# Patient Record
Sex: MALE | Race: ASIAN | NOT HISPANIC OR LATINO
[De-identification: names, ages, dates, MRNs, and addresses within clinical notes are randomized per-mention and may not be internally consistent; named-entity substitution may affect disease eponyms.]

---

## 2021-12-18 ENCOUNTER — TRANSCRIPTION ENCOUNTER (OUTPATIENT)
Age: 26
End: 2021-12-18

## 2021-12-18 ENCOUNTER — INPATIENT (INPATIENT)
Facility: HOSPITAL | Age: 26
LOS: 7 days | Discharge: ANOTHER HOSPITAL | DRG: 853 | End: 2021-12-26
Attending: SURGERY | Admitting: SURGERY
Payer: COMMERCIAL

## 2021-12-18 VITALS
OXYGEN SATURATION: 99 % | SYSTOLIC BLOOD PRESSURE: 97 MMHG | HEART RATE: 88 BPM | TEMPERATURE: 99 F | RESPIRATION RATE: 18 BRPM | DIASTOLIC BLOOD PRESSURE: 62 MMHG

## 2021-12-18 LAB
ALBUMIN SERPL ELPH-MCNC: 3.7 G/DL — SIGNIFICANT CHANGE UP (ref 3.4–5)
ALP SERPL-CCNC: 88 U/L — SIGNIFICANT CHANGE UP (ref 40–120)
ALT FLD-CCNC: 21 U/L — SIGNIFICANT CHANGE UP (ref 12–42)
ANION GAP SERPL CALC-SCNC: 12 MMOL/L — SIGNIFICANT CHANGE UP (ref 9–16)
APPEARANCE UR: CLEAR — SIGNIFICANT CHANGE UP
AST SERPL-CCNC: 10 U/L — LOW (ref 15–37)
BASOPHILS # BLD AUTO: 0.06 K/UL — SIGNIFICANT CHANGE UP (ref 0–0.2)
BASOPHILS NFR BLD AUTO: 0.3 % — SIGNIFICANT CHANGE UP (ref 0–2)
BILIRUB SERPL-MCNC: 1 MG/DL — SIGNIFICANT CHANGE UP (ref 0.2–1.2)
BILIRUB UR-MCNC: NEGATIVE — SIGNIFICANT CHANGE UP
BUN SERPL-MCNC: 12 MG/DL — SIGNIFICANT CHANGE UP (ref 7–23)
CALCIUM SERPL-MCNC: 9.4 MG/DL — SIGNIFICANT CHANGE UP (ref 8.5–10.5)
CHLORIDE SERPL-SCNC: 95 MMOL/L — LOW (ref 96–108)
CO2 SERPL-SCNC: 26 MMOL/L — SIGNIFICANT CHANGE UP (ref 22–31)
COLOR SPEC: YELLOW — SIGNIFICANT CHANGE UP
CREAT SERPL-MCNC: 1.37 MG/DL — HIGH (ref 0.5–1.3)
DIFF PNL FLD: ABNORMAL
EOSINOPHIL # BLD AUTO: 0.11 K/UL — SIGNIFICANT CHANGE UP (ref 0–0.5)
EOSINOPHIL NFR BLD AUTO: 0.5 % — SIGNIFICANT CHANGE UP (ref 0–6)
EPI CELLS # UR: SIGNIFICANT CHANGE UP /HPF (ref 0–5)
FLUAV AG NPH QL: SIGNIFICANT CHANGE UP
FLUBV AG NPH QL: SIGNIFICANT CHANGE UP
GLUCOSE SERPL-MCNC: 149 MG/DL — HIGH (ref 70–99)
GLUCOSE UR QL: NEGATIVE — SIGNIFICANT CHANGE UP
HCT VFR BLD CALC: 43.4 % — SIGNIFICANT CHANGE UP (ref 39–50)
HGB BLD-MCNC: 14.5 G/DL — SIGNIFICANT CHANGE UP (ref 13–17)
IMM GRANULOCYTES NFR BLD AUTO: 0.6 % — SIGNIFICANT CHANGE UP (ref 0–1.5)
KETONES UR-MCNC: NEGATIVE — SIGNIFICANT CHANGE UP
LEUKOCYTE ESTERASE UR-ACNC: NEGATIVE — SIGNIFICANT CHANGE UP
LIDOCAIN IGE QN: 36 U/L — LOW (ref 73–393)
LYMPHOCYTES # BLD AUTO: 0.43 K/UL — LOW (ref 1–3.3)
LYMPHOCYTES # BLD AUTO: 1.9 % — LOW (ref 13–44)
MANUAL SMEAR VERIFICATION: SIGNIFICANT CHANGE UP
MCHC RBC-ENTMCNC: 29.8 PG — SIGNIFICANT CHANGE UP (ref 27–34)
MCHC RBC-ENTMCNC: 33.4 GM/DL — SIGNIFICANT CHANGE UP (ref 32–36)
MCV RBC AUTO: 89.3 FL — SIGNIFICANT CHANGE UP (ref 80–100)
MONOCYTES # BLD AUTO: 0.99 K/UL — HIGH (ref 0–0.9)
MONOCYTES NFR BLD AUTO: 4.4 % — SIGNIFICANT CHANGE UP (ref 2–14)
NEUTROPHILS # BLD AUTO: 20.77 K/UL — HIGH (ref 1.8–7.4)
NEUTROPHILS NFR BLD AUTO: 92.3 % — HIGH (ref 43–77)
NITRITE UR-MCNC: NEGATIVE — SIGNIFICANT CHANGE UP
NRBC # BLD: 0 /100 WBCS — SIGNIFICANT CHANGE UP (ref 0–0)
PH UR: 6.5 — SIGNIFICANT CHANGE UP (ref 5–8)
PLAT MORPH BLD: NORMAL — SIGNIFICANT CHANGE UP
PLATELET # BLD AUTO: 304 K/UL — SIGNIFICANT CHANGE UP (ref 150–400)
POTASSIUM SERPL-MCNC: 3.4 MMOL/L — LOW (ref 3.5–5.3)
POTASSIUM SERPL-SCNC: 3.4 MMOL/L — LOW (ref 3.5–5.3)
PROT SERPL-MCNC: 8.5 G/DL — HIGH (ref 6.4–8.2)
PROT UR-MCNC: 100 MG/DL
RBC # BLD: 4.86 M/UL — SIGNIFICANT CHANGE UP (ref 4.2–5.8)
RBC # FLD: 11.9 % — SIGNIFICANT CHANGE UP (ref 10.3–14.5)
RBC BLD AUTO: NORMAL — SIGNIFICANT CHANGE UP
RBC CASTS # UR COMP ASSIST: < 5 /HPF — SIGNIFICANT CHANGE UP
RSV RNA NPH QL NAA+NON-PROBE: SIGNIFICANT CHANGE UP
SARS-COV-2 RNA SPEC QL NAA+PROBE: SIGNIFICANT CHANGE UP
SODIUM SERPL-SCNC: 133 MMOL/L — SIGNIFICANT CHANGE UP (ref 132–145)
SP GR SPEC: 1.02 — SIGNIFICANT CHANGE UP (ref 1–1.03)
UROBILINOGEN FLD QL: 0.2 E.U./DL — SIGNIFICANT CHANGE UP
WBC # BLD: 22.5 K/UL — HIGH (ref 3.8–10.5)
WBC # FLD AUTO: 22.5 K/UL — HIGH (ref 3.8–10.5)
WBC UR QL: < 5 /HPF — SIGNIFICANT CHANGE UP

## 2021-12-18 PROCEDURE — 99284 EMERGENCY DEPT VISIT MOD MDM: CPT

## 2021-12-18 PROCEDURE — 71046 X-RAY EXAM CHEST 2 VIEWS: CPT | Mod: 26

## 2021-12-18 PROCEDURE — 74177 CT ABD & PELVIS W/CONTRAST: CPT | Mod: 26

## 2021-12-18 RX ORDER — SODIUM CHLORIDE 9 MG/ML
1000 INJECTION INTRAMUSCULAR; INTRAVENOUS; SUBCUTANEOUS ONCE
Refills: 0 | Status: COMPLETED | OUTPATIENT
Start: 2021-12-18 | End: 2021-12-18

## 2021-12-18 RX ORDER — KETOROLAC TROMETHAMINE 30 MG/ML
15 SYRINGE (ML) INJECTION ONCE
Refills: 0 | Status: DISCONTINUED | OUTPATIENT
Start: 2021-12-18 | End: 2021-12-18

## 2021-12-18 RX ORDER — METRONIDAZOLE 500 MG
500 TABLET ORAL ONCE
Refills: 0 | Status: COMPLETED | OUTPATIENT
Start: 2021-12-18 | End: 2021-12-18

## 2021-12-18 RX ORDER — ACETAMINOPHEN 500 MG
650 TABLET ORAL ONCE
Refills: 0 | Status: COMPLETED | OUTPATIENT
Start: 2021-12-18 | End: 2021-12-18

## 2021-12-18 RX ORDER — MORPHINE SULFATE 50 MG/1
4 CAPSULE, EXTENDED RELEASE ORAL ONCE
Refills: 0 | Status: DISCONTINUED | OUTPATIENT
Start: 2021-12-18 | End: 2021-12-18

## 2021-12-18 RX ORDER — IOHEXOL 300 MG/ML
30 INJECTION, SOLUTION INTRAVENOUS ONCE
Refills: 0 | Status: COMPLETED | OUTPATIENT
Start: 2021-12-18 | End: 2021-12-18

## 2021-12-18 RX ORDER — CEFTRIAXONE 500 MG/1
1000 INJECTION, POWDER, FOR SOLUTION INTRAMUSCULAR; INTRAVENOUS ONCE
Refills: 0 | Status: COMPLETED | OUTPATIENT
Start: 2021-12-18 | End: 2021-12-18

## 2021-12-18 RX ADMIN — Medication 100 MILLIGRAM(S): at 23:43

## 2021-12-18 RX ADMIN — SODIUM CHLORIDE 1000 MILLILITER(S): 9 INJECTION INTRAMUSCULAR; INTRAVENOUS; SUBCUTANEOUS at 19:00

## 2021-12-18 RX ADMIN — CEFTRIAXONE 100 MILLIGRAM(S): 500 INJECTION, POWDER, FOR SOLUTION INTRAMUSCULAR; INTRAVENOUS at 23:40

## 2021-12-18 RX ADMIN — IOHEXOL 30 MILLILITER(S): 300 INJECTION, SOLUTION INTRAVENOUS at 20:52

## 2021-12-18 RX ADMIN — Medication 15 MILLIGRAM(S): at 19:01

## 2021-12-18 RX ADMIN — Medication 650 MILLIGRAM(S): at 19:01

## 2021-12-18 NOTE — ED ADULT TRIAGE NOTE - DOMESTIC TRAVEL HIGH RISK QUESTION
EGD note reviewed.   HGB is still at 6.5 up from 6.3 with 1 unit PRBC/     Will give 1 unit PRBC now.   D/w Mariel GRIFFITH RN   No

## 2021-12-18 NOTE — ED PROVIDER NOTE - ATTENDING CONTRIBUTION TO CARE
25 y/o M presenting with lower Abd pain and N/V/D. On exam, Pt appears well and in no acute distress. Plan for workup to r/o COVID-19 versus UTI versus gastritis. Will reassess clinically after results have been obtained.  (+) abdominal tenderness on re-exam. wbc 22, T 99, CT a/p ordered.  Appy admit to surg Marco

## 2021-12-18 NOTE — ED ADULT NURSE REASSESSMENT NOTE - NS ED NURSE REASSESS COMMENT FT1
Transfer of care from SAUL Chacon. Pt resting in a chair comfortably, in NAD. Pending CT results. Will continue to monitor.

## 2021-12-18 NOTE — ED PROVIDER NOTE - CLINICAL SUMMARY MEDICAL DECISION MAKING FREE TEXT BOX
25 y/o M presenting with lower Abd pain and N/V/D. On exam, Pt appears well and in no acute distress. Plan for workup to r/o COVID-19 versus UTI versus gastritis. Will reassess clinically after results have been obtained. 27 y/o M presenting with lower Abd pain and N/V/D. On exam, Pt appears well and in no acute distress. Plan for workup to r/o COVID-19 versus UTI versus gastritis. Will reassess clinically after results have been obtained.  (+) abdominal tenderness on re-exam. wbc 22, T 99, CT a/p ordered.

## 2021-12-18 NOTE — ED PROVIDER NOTE - PROGRESS NOTE DETAILS
Patient continues to complain of abdominal pain. On re-examination, abdomen is soft, Tenderness to suprapubic and RLQ. CT a/p ordered.

## 2021-12-18 NOTE — ED PROVIDER NOTE - OBJECTIVE STATEMENT
27 y/o M with no PMH presents to the ED for an acute onset of lower Abd pain, N/V, yellow diarrhea, and difficulty urinating. Pt reports symptoms started last night and progressed this morning. He endorses shortness of breath when the pain increases. Pt notes subjective fevers. He ate sushi last night which could be attributed to the symptoms [although his girlfriend is asymptomatic after eating the same meal]. Pt denies recent travels. He is vaccinated for COVID-19.

## 2021-12-19 ENCOUNTER — RESULT REVIEW (OUTPATIENT)
Age: 26
End: 2021-12-19

## 2021-12-19 DIAGNOSIS — K08.409 PARTIAL LOSS OF TEETH, UNSPECIFIED CAUSE, UNSPECIFIED CLASS: Chronic | ICD-10-CM

## 2021-12-19 LAB
ANION GAP SERPL CALC-SCNC: 15 MMOL/L — SIGNIFICANT CHANGE UP (ref 5–17)
APTT BLD: 57.3 SEC — HIGH (ref 27.5–35.5)
BLD GP AB SCN SERPL QL: NEGATIVE — SIGNIFICANT CHANGE UP
BUN SERPL-MCNC: 12 MG/DL — SIGNIFICANT CHANGE UP (ref 7–23)
CALCIUM SERPL-MCNC: 9.4 MG/DL — SIGNIFICANT CHANGE UP (ref 8.4–10.5)
CHLORIDE SERPL-SCNC: 99 MMOL/L — SIGNIFICANT CHANGE UP (ref 96–108)
CO2 SERPL-SCNC: 19 MMOL/L — LOW (ref 22–31)
CREAT SERPL-MCNC: 1.24 MG/DL — SIGNIFICANT CHANGE UP (ref 0.5–1.3)
CULTURE RESULTS: NO GROWTH — SIGNIFICANT CHANGE UP
GLUCOSE SERPL-MCNC: 128 MG/DL — HIGH (ref 70–99)
HCT VFR BLD CALC: 38.9 % — LOW (ref 39–50)
HGB BLD-MCNC: 13.2 G/DL — SIGNIFICANT CHANGE UP (ref 13–17)
INR BLD: 1.26 — HIGH (ref 0.88–1.16)
MAGNESIUM SERPL-MCNC: 1.4 MG/DL — LOW (ref 1.6–2.6)
MCHC RBC-ENTMCNC: 30.6 PG — SIGNIFICANT CHANGE UP (ref 27–34)
MCHC RBC-ENTMCNC: 33.9 GM/DL — SIGNIFICANT CHANGE UP (ref 32–36)
MCV RBC AUTO: 90 FL — SIGNIFICANT CHANGE UP (ref 80–100)
NRBC # BLD: 0 /100 WBCS — SIGNIFICANT CHANGE UP (ref 0–0)
PHOSPHATE SERPL-MCNC: 1.6 MG/DL — LOW (ref 2.5–4.5)
PLATELET # BLD AUTO: 286 K/UL — SIGNIFICANT CHANGE UP (ref 150–400)
POTASSIUM SERPL-MCNC: 3.4 MMOL/L — LOW (ref 3.5–5.3)
POTASSIUM SERPL-SCNC: 3.4 MMOL/L — LOW (ref 3.5–5.3)
PROTHROM AB SERPL-ACNC: 15 SEC — HIGH (ref 10.6–13.6)
RBC # BLD: 4.32 M/UL — SIGNIFICANT CHANGE UP (ref 4.2–5.8)
RBC # FLD: 12 % — SIGNIFICANT CHANGE UP (ref 10.3–14.5)
RH IG SCN BLD-IMP: POSITIVE — SIGNIFICANT CHANGE UP
SODIUM SERPL-SCNC: 133 MMOL/L — LOW (ref 135–145)
SPECIMEN SOURCE: SIGNIFICANT CHANGE UP
WBC # BLD: 18.34 K/UL — HIGH (ref 3.8–10.5)
WBC # FLD AUTO: 18.34 K/UL — HIGH (ref 3.8–10.5)

## 2021-12-19 PROCEDURE — 88312 SPECIAL STAINS GROUP 1: CPT | Mod: 26

## 2021-12-19 PROCEDURE — 88305 TISSUE EXAM BY PATHOLOGIST: CPT | Mod: 26

## 2021-12-19 PROCEDURE — 93010 ELECTROCARDIOGRAM REPORT: CPT

## 2021-12-19 PROCEDURE — 71045 X-RAY EXAM CHEST 1 VIEW: CPT | Mod: 26

## 2021-12-19 PROCEDURE — 99255 IP/OBS CONSLTJ NEW/EST HI 80: CPT | Mod: GC

## 2021-12-19 RX ORDER — FLUCONAZOLE 150 MG/1
TABLET ORAL
Refills: 0 | Status: DISCONTINUED | OUTPATIENT
Start: 2021-12-19 | End: 2021-12-22

## 2021-12-19 RX ORDER — METRONIDAZOLE 500 MG
500 TABLET ORAL EVERY 8 HOURS
Refills: 0 | Status: DISCONTINUED | OUTPATIENT
Start: 2021-12-19 | End: 2021-12-19

## 2021-12-19 RX ORDER — FLUCONAZOLE 150 MG/1
400 TABLET ORAL ONCE
Refills: 0 | Status: COMPLETED | OUTPATIENT
Start: 2021-12-19 | End: 2021-12-19

## 2021-12-19 RX ORDER — CEFTRIAXONE 500 MG/1
1000 INJECTION, POWDER, FOR SOLUTION INTRAMUSCULAR; INTRAVENOUS EVERY 24 HOURS
Refills: 0 | Status: DISCONTINUED | OUTPATIENT
Start: 2021-12-19 | End: 2021-12-19

## 2021-12-19 RX ORDER — ACETAMINOPHEN 500 MG
650 TABLET ORAL EVERY 6 HOURS
Refills: 0 | Status: DISCONTINUED | OUTPATIENT
Start: 2021-12-19 | End: 2021-12-23

## 2021-12-19 RX ORDER — ACETAMINOPHEN 500 MG
1000 TABLET ORAL ONCE
Refills: 0 | Status: COMPLETED | OUTPATIENT
Start: 2021-12-19 | End: 2021-12-19

## 2021-12-19 RX ORDER — KETOROLAC TROMETHAMINE 30 MG/ML
15 SYRINGE (ML) INJECTION EVERY 6 HOURS
Refills: 0 | Status: DISCONTINUED | OUTPATIENT
Start: 2021-12-19 | End: 2021-12-22

## 2021-12-19 RX ORDER — HEPARIN SODIUM 5000 [USP'U]/ML
5000 INJECTION INTRAVENOUS; SUBCUTANEOUS EVERY 8 HOURS
Refills: 0 | Status: DISCONTINUED | OUTPATIENT
Start: 2021-12-19 | End: 2021-12-22

## 2021-12-19 RX ORDER — ONDANSETRON 8 MG/1
4 TABLET, FILM COATED ORAL EVERY 6 HOURS
Refills: 0 | Status: DISCONTINUED | OUTPATIENT
Start: 2021-12-19 | End: 2021-12-23

## 2021-12-19 RX ORDER — SODIUM CHLORIDE 9 MG/ML
1000 INJECTION, SOLUTION INTRAVENOUS
Refills: 0 | Status: DISCONTINUED | OUTPATIENT
Start: 2021-12-19 | End: 2021-12-20

## 2021-12-19 RX ORDER — FLUCONAZOLE 150 MG/1
400 TABLET ORAL EVERY 24 HOURS
Refills: 0 | Status: DISCONTINUED | OUTPATIENT
Start: 2021-12-20 | End: 2021-12-22

## 2021-12-19 RX ORDER — HYDROMORPHONE HYDROCHLORIDE 2 MG/ML
0.5 INJECTION INTRAMUSCULAR; INTRAVENOUS; SUBCUTANEOUS EVERY 4 HOURS
Refills: 0 | Status: DISCONTINUED | OUTPATIENT
Start: 2021-12-19 | End: 2021-12-20

## 2021-12-19 RX ORDER — PIPERACILLIN AND TAZOBACTAM 4; .5 G/20ML; G/20ML
3.38 INJECTION, POWDER, LYOPHILIZED, FOR SOLUTION INTRAVENOUS EVERY 6 HOURS
Refills: 0 | Status: DISCONTINUED | OUTPATIENT
Start: 2021-12-19 | End: 2021-12-23

## 2021-12-19 RX ORDER — HYDROMORPHONE HYDROCHLORIDE 2 MG/ML
1 INJECTION INTRAMUSCULAR; INTRAVENOUS; SUBCUTANEOUS EVERY 4 HOURS
Refills: 0 | Status: DISCONTINUED | OUTPATIENT
Start: 2021-12-19 | End: 2021-12-20

## 2021-12-19 RX ADMIN — Medication 650 MILLIGRAM(S): at 18:20

## 2021-12-19 RX ADMIN — MORPHINE SULFATE 4 MILLIGRAM(S): 50 CAPSULE, EXTENDED RELEASE ORAL at 00:06

## 2021-12-19 RX ADMIN — FLUCONAZOLE 100 MILLIGRAM(S): 150 TABLET ORAL at 13:39

## 2021-12-19 RX ADMIN — SODIUM CHLORIDE 110 MILLILITER(S): 9 INJECTION, SOLUTION INTRAVENOUS at 05:06

## 2021-12-19 RX ADMIN — Medication 100 MILLIGRAM(S): at 07:28

## 2021-12-19 RX ADMIN — Medication 15 MILLIGRAM(S): at 18:35

## 2021-12-19 RX ADMIN — Medication 400 MILLIGRAM(S): at 05:06

## 2021-12-19 RX ADMIN — PIPERACILLIN AND TAZOBACTAM 200 GRAM(S): 4; .5 INJECTION, POWDER, LYOPHILIZED, FOR SOLUTION INTRAVENOUS at 13:05

## 2021-12-19 RX ADMIN — Medication 650 MILLIGRAM(S): at 19:20

## 2021-12-19 RX ADMIN — HEPARIN SODIUM 5000 UNIT(S): 5000 INJECTION INTRAVENOUS; SUBCUTANEOUS at 16:29

## 2021-12-19 RX ADMIN — Medication 15 MILLIGRAM(S): at 13:06

## 2021-12-19 RX ADMIN — Medication 15 MILLIGRAM(S): at 18:21

## 2021-12-19 RX ADMIN — HEPARIN SODIUM 5000 UNIT(S): 5000 INJECTION INTRAVENOUS; SUBCUTANEOUS at 05:06

## 2021-12-19 RX ADMIN — Medication 15 MILLIGRAM(S): at 13:38

## 2021-12-19 RX ADMIN — HEPARIN SODIUM 5000 UNIT(S): 5000 INJECTION INTRAVENOUS; SUBCUTANEOUS at 22:45

## 2021-12-19 RX ADMIN — PIPERACILLIN AND TAZOBACTAM 200 GRAM(S): 4; .5 INJECTION, POWDER, LYOPHILIZED, FOR SOLUTION INTRAVENOUS at 18:21

## 2021-12-19 RX ADMIN — Medication 1000 MILLIGRAM(S): at 05:30

## 2021-12-19 NOTE — H&P ADULT - HISTORY OF PRESENT ILLNESS
25 yo M with no sign PMH/PSH presenting with one day of abdominal pain. Associated with nausea, 1 episode of NBNB emesis, diarrhea, fever/chills, decreased appetite. He reports that he had difficulty urinating yesterday - which resolved. Of note he had similar abd pain on Tuesday but pain resolved until Friday night.     Colonoscopy - never    ED: Afebrile. soft BP - responded to bolus. WBC 22.5, Cr = 1.37, CT - appendix measuring up to 1.1 cm in diameter, no appendicolith    PMH: partial fusion of L5  PSH: wisdom teeth removal  FH: UC - father  Meds: none  Soc: non smoking, ETOH socially 5-10 drinks on the weekend, no recreational drug use

## 2021-12-19 NOTE — H&P ADULT - NSHPPHYSICALEXAM_GEN_ALL_CORE
LOS:     VITALS:   T(C): 37.5 (12-19-21 @ 06:17), Max: 37.5 (12-19-21 @ 02:18)  HR: 88 (12-19-21 @ 06:17) (88 - 100)  BP: 101/54 (12-19-21 @ 04:28) (97/62 - 126/76)  RR: 18 (12-19-21 @ 04:28) (18 - 18)  SpO2: 98% (12-19-21 @ 04:28) (97% - 99%)    GENERAL: NAD, lying in bed comfortably  HEAD:  Atraumatic, Normocephalic  CHEST/LUNG: Clear to auscultation bilaterally;  Unlabored respirations  HEART: Regular rate and rhythm;   ABDOMEN: mildly distended, guarding, tender to palpation in all 4 quadrants (worst in RLQ), +Rovsing sign, - psoas sign  NERVOUS SYSTEM:  no focal deficits LOS:     VITALS:   T(C): 37.5 (12-19-21 @ 06:17), Max: 37.5 (12-19-21 @ 02:18)  HR: 88 (12-19-21 @ 06:17) (88 - 100)  BP: 101/54 (12-19-21 @ 04:28) (97/62 - 126/76)  RR: 18 (12-19-21 @ 04:28) (18 - 18)  SpO2: 98% (12-19-21 @ 04:28) (97% - 99%)    GENERAL: NAD, lying in bed comfortably  HEAD:  Atraumatic, Normocephalic  CHEST/LUNG: Clear to auscultation bilaterally;  Unlabored respirations  HEART: Regular rate and rhythm;   ABDOMEN: firm, mildly distended, guarding, tender to palpation in all 4 quadrants (worst in suprpubic region), +Rovsing sign, - psoas sign  NERVOUS SYSTEM:  no focal deficits

## 2021-12-19 NOTE — H&P ADULT - ASSESSMENT
25 yo M with no sign PMH/PSH presenting with appendicitis.    NPO/IVF  Pain/Nausea PRN  Ceft/Flagyl  HSQ/SCD  Added on for OR  Plan discussed with chief and attending

## 2021-12-19 NOTE — H&P ADULT - NSHPLABSRESULTS_GEN_ALL_CORE
.  LABS:                         13.2   18.34 )-----------( 286      ( 19 Dec 2021 06:22 )             38.9         133<L>  |  99  |  12  ----------------------------<  128<H>  3.4<L>   |  19<L>  |  1.24    Ca    9.4      19 Dec 2021 06:22  Phos  1.6       Mg     1.4         TPro  8.5<H>  /  Alb  3.7  /  TBili  1.0  /  DBili  x   /  AST  10<L>  /  ALT  21  /  AlkPhos  88      PT/INR - ( 19 Dec 2021 06:22 )   PT: 15.0 sec;   INR: 1.26          PTT - ( 19 Dec 2021 06:22 )  PTT:57.3 sec  Urinalysis Basic - ( 18 Dec 2021 19:24 )    Color: Yellow / Appearance: Clear / S.020 / pH: x  Gluc: x / Ketone: NEGATIVE  / Bili: NEGATIVE / Urobili: 0.2 E.U./dL   Blood: x / Protein: 100 mg/dL / Nitrite: NEGATIVE   Leuk Esterase: NEGATIVE / RBC: < 5 /HPF / WBC < 5 /HPF   Sq Epi: x / Non Sq Epi: 0-5 /HPF / Bacteria: x            CT abd/pelvis ()  INTERPRETATION: CT of the ABDOMEN and PELVIS with intravenous contrast dated 2021 9:50 PM    INDICATION: Abdominal pain    TECHNIQUE: CT of the abdomen and pelvis with intravenous and oral contrast. Axial, sagittal, and coronal images were obtained and reviewed. 93 cc of Omnipaque 350 intravenous contrast was administered; 7 cc was discarded.    COMPARISON: None.    FINDINGS:    Lower chest: Solitary left lower lobe micronodule.    Liver: Smooth contour. No focal lesion.    Biliary system: Gallbladder is normal in size. No calcified gallstones. No biliary ductal dilatation.    Pancreas: Unremarkable.    Spleen: Unremarkable.    Adrenal glands: Unremarkable.    Kidneys: 0.8 cm left lower pole hypodensity, too small to characterize. No hydronephrosis. No renal or ureteral stone.    Bowel/Peritoneum: Ill-defined, enlarged appendix measuring up to 1.1 cm in diameter with periappendiceal fat stranding and thickening of the adjacent lateral conal fascia. No periappendiceal fluid collection or evidence of perforation. Enteric contrast is seen reaching the mid small bowel. Mesenteric fat stranding and engorgement of the vasa recta involving the small bowel and colon which may be reactive from adjacent appendicitis or concurrent enterocolitis. Small pelvic free fluid with greater than simple fluid density (HU 29) which may represent small blood products. No bowel obstruction. No free air.    Pelvic organs: Unremarkable.    Lymph nodes: Multiple mildly enlarged right lower quadrant mesenteric lymph nodes. 1.0 cm, likely reactive.    Vascular: Normal caliber aorta.    Bones/Soft tissues: No significant abnormality.      IMPRESSION:    1. Acute appendicitis without periappendiceal fluid collection or evidence of perforation.  2. Small pelvic free fluid with greater than simple fluid density which may represent intermixed blood products.

## 2021-12-19 NOTE — BRIEF OPERATIVE NOTE - OPERATION/FINDINGS
Shannan trocar cutdown. UR-6 VIcryl placed. Insufflation. ABdomen with peritonitis, evidence of supparative perforation of appendix. With multiple pockets of abscess. Cecum and ileum severely adhered in pelvis, unable to visualize or mobilize to IC valve or cecum. Ligasure to mobilize proximal ascending colon. Converted to open, with RLQ incision. Blunt dissection to free up adhesions and purulent pockets. Area of suspected appendiceal perforation identifed. Dissected back to area of healthy cecum. At this point we used LLUVIA blue load 75 to staple at healthy area. Oversewed with 2-0 VIcryl. Shannan trocar cutdown. UR-6 VIcryl placed. Insufflation. ABdomen with peritonitis, evidence of supparative perforation of appendix. With multiple pockets of abscess. Cecum and ileum severely adhered in pelvis, unable to visualize or mobilize to IC valve or cecum. Ligasure to mobilize proximal ascending colon. Converted to open, with RLQ incision. Blunt dissection to free up adhesions and purulent pockets. Area of suspected appendiceal perforation identifed. Dissected back to area of healthy cecum. At this point we used LLUVIA blue load 75 to staple at healthy area. Oversewed with 2-0 VIcryl. Abdomen was irrigated. Luis Manuel 19Fr placed in pelvisClosure of peritoneal defect with 2-0 Vicryl. 2-0 PDS used for fascial closure. Skin was left loosely approximated. Monocryl for closure of previous port sites

## 2021-12-19 NOTE — H&P ADULT - NSICDXFAMILYHX_GEN_ALL_CORE_FT
FAMILY HISTORY:  Father  Still living? Unknown  Family history of ulcerative colitis, Age at diagnosis: Age Unknown

## 2021-12-19 NOTE — PROVIDER CONTACT NOTE (CHANGE IN STATUS NOTIFICATION) - ACTION/TREATMENT ORDERED:
Notified provider. tylenol administered. Provider at bedside. cultures done by provider. No other interventions at this time. Will continue to monitor. Waiting for OR.

## 2021-12-19 NOTE — PROGRESS NOTE ADULT - SUBJECTIVE AND OBJECTIVE BOX
POST-OPERATIVE NOTE    Procedure: laparoscopic appendectomy with open cecum excision    Diagnosis/Indication: ruptured suppurative appendicitis    Surgeon: Elizabet Dela Cruz    S: Pt has no complaints. Mild RLQ abdominal tenderness. Denies CP, SOB, MELENDEZ, calf tenderness. Pain controlled with medication. Denies nausea, vomiting.    O:  T(C): 36.7 (12-19-21 @ 14:21), Max: 36.7 (12-19-21 @ 14:21)  T(F): 98.1 (12-19-21 @ 14:21), Max: 98.1 (12-19-21 @ 14:21)  HR: 71 (12-19-21 @ 14:21) (71 - 71)  BP: 114/65 (12-19-21 @ 14:21) (114/65 - 114/65)  RR: 18 (12-19-21 @ 14:21) (18 - 18)  SpO2: 95% (12-19-21 @ 14:21) (95% - 95%)  Wt(kg): --                        13.2   18.34 )-----------( 286      ( 19 Dec 2021 06:22 )             38.9     12-19    133<L>  |  99  |  12  ----------------------------<  128<H>  3.4<L>   |  19<L>  |  1.24    Ca    9.4      19 Dec 2021 06:22  Phos  1.6     12-19  Mg     1.4     12-19    TPro  8.5<H>  /  Alb  3.7  /  TBili  1.0  /  DBili  x   /  AST  10<L>  /  ALT  21  /  AlkPhos  88  12-18      Gen: NAD, resting comfortably in bed  C/V: NSR  Pulm: Nonlabored breathing, no respiratory distress  Abd: soft, appropriately tender palpation, laparoscopic incisions c/d/i  Extrem: WWP, no calf edema or tenderness, SCDs in place      A/P: 26y Male s/p above procedure  Diet: Clears  IVF: LR @ 110  Fluconazole/Zosyn  Pain/nausea control  SQH/SCDs/OOBA/IS  Dispo: Floors

## 2021-12-19 NOTE — CONSULT NOTE ADULT - ASSESSMENT
# Sepsis (present on admission)  At time of arrival to [facility], met at least 2/4 SIRS criteria. WBCs 22, HR >90bpm,  Source: Likely 2/2 acute appendicitis with perforation   Improved with appendectomy and empiric antibiotics  26 year old man admitted for acute appendicitis complicated by sepsis     # Sepsis (present on admission)  At time of arrival to [facility], met at least 2/4 SIRS criteria. WBCs 22, HR >90bpm,  Source: Likely 2/2 acute appendicitis with perforation   Improved with appendectomy and empiric antibiotics     # Acute appendicitis  s/p appendectomy   Advance diet as tolerated   Rest of care per primary team     # post-operative state  Encourage ambulation   Encourage incentive spirometry   Recommend miralax qd if patient develops constipation post-op    # Hypophosphatemia - Phosphorus Level, Serum:   Phosphorus Level, Serum: 1.6 mg/dL (12-19-21 @ 06:22)  ; Likely 2/2 poor PO intake Replete    # Hypokalemia   Potassium, Serum: 3.4 mmol/L (12-19-21 @ 06:22)   ; Likely 2/2 poor PO intake; Replete    # Hypomagnesemia  Magnesium, Serum: 1.4 mg/dL (12-19-21 @ 06:22)   ; Likely 2/2 poor PO intake; Replete

## 2021-12-19 NOTE — CONSULT NOTE ADULT - NSCONSULTADDITIONALINFOA_GEN_ALL_CORE
High risk medical decision making: required intravenous morphine for pain control on 12/19. Underwent appendectomy (laparoscopic converted to open ) on 12/19

## 2021-12-19 NOTE — PACU DISCHARGE NOTE - COMMENTS
report given to garland laurent, pt. awake, a/o x4 . hemodynamically stable. denies pain. dsg intact/dryw/ jpx1 serosanguinous fluids small amount. guerrier cath draining yellow urine. d/c to floor stretcher.

## 2021-12-19 NOTE — PATIENT PROFILE ADULT - FALL HARM RISK - UNIVERSAL INTERVENTIONS
Bed in lowest position, wheels locked, appropriate side rails in place/Call bell, personal items and telephone in reach/Instruct patient to call for assistance before getting out of bed or chair/Non-slip footwear when patient is out of bed/D Lo to call system/Physically safe environment - no spills, clutter or unnecessary equipment/Purposeful Proactive Rounding/Room/bathroom lighting operational, light cord in reach

## 2021-12-19 NOTE — PROVIDER CONTACT NOTE (CHANGE IN STATUS NOTIFICATION) - ASSESSMENT
Pt denies sob, chest pain, blurry vision and dizziness. temp oral- 103.1F, HR-88, BP-101/54, RR-18 and O2- 98% RA. No interventions at this time.

## 2021-12-20 LAB
ANION GAP SERPL CALC-SCNC: 11 MMOL/L — SIGNIFICANT CHANGE UP (ref 5–17)
BILIRUB SERPL-MCNC: 0.2 MG/DL — SIGNIFICANT CHANGE UP (ref 0.2–1.2)
BUN SERPL-MCNC: 13 MG/DL — SIGNIFICANT CHANGE UP (ref 7–23)
CALCIUM SERPL-MCNC: 9 MG/DL — SIGNIFICANT CHANGE UP (ref 8.4–10.5)
CHLORIDE SERPL-SCNC: 105 MMOL/L — SIGNIFICANT CHANGE UP (ref 96–108)
CO2 SERPL-SCNC: 22 MMOL/L — SIGNIFICANT CHANGE UP (ref 22–31)
CREAT SERPL-MCNC: 1.07 MG/DL — SIGNIFICANT CHANGE UP (ref 0.5–1.3)
GLUCOSE SERPL-MCNC: 192 MG/DL — HIGH (ref 70–99)
GRAM STN FLD: SIGNIFICANT CHANGE UP
HCT VFR BLD CALC: 34.9 % — LOW (ref 39–50)
HGB BLD-MCNC: 11.5 G/DL — LOW (ref 13–17)
INR BLD: 1.15 — SIGNIFICANT CHANGE UP (ref 0.88–1.16)
MAGNESIUM SERPL-MCNC: 1.8 MG/DL — SIGNIFICANT CHANGE UP (ref 1.6–2.6)
MCHC RBC-ENTMCNC: 29.9 PG — SIGNIFICANT CHANGE UP (ref 27–34)
MCHC RBC-ENTMCNC: 33 GM/DL — SIGNIFICANT CHANGE UP (ref 32–36)
MCV RBC AUTO: 90.6 FL — SIGNIFICANT CHANGE UP (ref 80–100)
MELD SCORE WITH DIALYSIS: 21 POINTS — SIGNIFICANT CHANGE UP
MELD SCORE WITHOUT DIALYSIS: 9 POINTS — SIGNIFICANT CHANGE UP
NRBC # BLD: 0 /100 WBCS — SIGNIFICANT CHANGE UP (ref 0–0)
PHOSPHATE SERPL-MCNC: 2.5 MG/DL — SIGNIFICANT CHANGE UP (ref 2.5–4.5)
PLATELET # BLD AUTO: 259 K/UL — SIGNIFICANT CHANGE UP (ref 150–400)
POTASSIUM SERPL-MCNC: 4 MMOL/L — SIGNIFICANT CHANGE UP (ref 3.5–5.3)
POTASSIUM SERPL-SCNC: 4 MMOL/L — SIGNIFICANT CHANGE UP (ref 3.5–5.3)
PROTHROM AB SERPL-ACNC: 13.7 SEC — HIGH (ref 10.6–13.6)
RBC # BLD: 3.85 M/UL — LOW (ref 4.2–5.8)
RBC # FLD: 12.4 % — SIGNIFICANT CHANGE UP (ref 10.3–14.5)
SODIUM SERPL-SCNC: 138 MMOL/L — SIGNIFICANT CHANGE UP (ref 135–145)
SPECIMEN SOURCE: SIGNIFICANT CHANGE UP
WBC # BLD: 17.84 K/UL — HIGH (ref 3.8–10.5)
WBC # FLD AUTO: 17.84 K/UL — HIGH (ref 3.8–10.5)

## 2021-12-20 RX ORDER — MAGNESIUM SULFATE 500 MG/ML
1 VIAL (ML) INJECTION ONCE
Refills: 0 | Status: COMPLETED | OUTPATIENT
Start: 2021-12-20 | End: 2021-12-20

## 2021-12-20 RX ORDER — OXYCODONE HYDROCHLORIDE 5 MG/1
5 TABLET ORAL EVERY 4 HOURS
Refills: 0 | Status: DISCONTINUED | OUTPATIENT
Start: 2021-12-20 | End: 2021-12-22

## 2021-12-20 RX ORDER — SODIUM CHLORIDE 9 MG/ML
1000 INJECTION, SOLUTION INTRAVENOUS
Refills: 0 | Status: DISCONTINUED | OUTPATIENT
Start: 2021-12-20 | End: 2021-12-20

## 2021-12-20 RX ADMIN — Medication 650 MILLIGRAM(S): at 00:37

## 2021-12-20 RX ADMIN — OXYCODONE HYDROCHLORIDE 5 MILLIGRAM(S): 5 TABLET ORAL at 13:04

## 2021-12-20 RX ADMIN — PIPERACILLIN AND TAZOBACTAM 200 GRAM(S): 4; .5 INJECTION, POWDER, LYOPHILIZED, FOR SOLUTION INTRAVENOUS at 18:09

## 2021-12-20 RX ADMIN — Medication 650 MILLIGRAM(S): at 11:10

## 2021-12-20 RX ADMIN — Medication 650 MILLIGRAM(S): at 23:20

## 2021-12-20 RX ADMIN — PIPERACILLIN AND TAZOBACTAM 200 GRAM(S): 4; .5 INJECTION, POWDER, LYOPHILIZED, FOR SOLUTION INTRAVENOUS at 00:07

## 2021-12-20 RX ADMIN — Medication 15 MILLIGRAM(S): at 18:09

## 2021-12-20 RX ADMIN — OXYCODONE HYDROCHLORIDE 5 MILLIGRAM(S): 5 TABLET ORAL at 17:11

## 2021-12-20 RX ADMIN — Medication 62.5 MILLIMOLE(S): at 10:52

## 2021-12-20 RX ADMIN — SODIUM CHLORIDE 110 MILLILITER(S): 9 INJECTION, SOLUTION INTRAVENOUS at 02:28

## 2021-12-20 RX ADMIN — Medication 15 MILLIGRAM(S): at 06:09

## 2021-12-20 RX ADMIN — OXYCODONE HYDROCHLORIDE 5 MILLIGRAM(S): 5 TABLET ORAL at 17:03

## 2021-12-20 RX ADMIN — Medication 650 MILLIGRAM(S): at 22:23

## 2021-12-20 RX ADMIN — PIPERACILLIN AND TAZOBACTAM 200 GRAM(S): 4; .5 INJECTION, POWDER, LYOPHILIZED, FOR SOLUTION INTRAVENOUS at 06:08

## 2021-12-20 RX ADMIN — Medication 15 MILLIGRAM(S): at 00:37

## 2021-12-20 RX ADMIN — Medication 15 MILLIGRAM(S): at 11:15

## 2021-12-20 RX ADMIN — PIPERACILLIN AND TAZOBACTAM 200 GRAM(S): 4; .5 INJECTION, POWDER, LYOPHILIZED, FOR SOLUTION INTRAVENOUS at 11:15

## 2021-12-20 RX ADMIN — Medication 650 MILLIGRAM(S): at 11:08

## 2021-12-20 RX ADMIN — OXYCODONE HYDROCHLORIDE 5 MILLIGRAM(S): 5 TABLET ORAL at 13:00

## 2021-12-20 RX ADMIN — Medication 650 MILLIGRAM(S): at 00:07

## 2021-12-20 RX ADMIN — Medication 15 MILLIGRAM(S): at 00:07

## 2021-12-20 RX ADMIN — Medication 15 MILLIGRAM(S): at 11:16

## 2021-12-20 RX ADMIN — Medication 100 GRAM(S): at 09:25

## 2021-12-20 RX ADMIN — FLUCONAZOLE 100 MILLIGRAM(S): 150 TABLET ORAL at 11:15

## 2021-12-20 RX ADMIN — Medication 15 MILLIGRAM(S): at 18:12

## 2021-12-20 RX ADMIN — Medication 15 MILLIGRAM(S): at 06:30

## 2021-12-20 RX ADMIN — Medication 650 MILLIGRAM(S): at 06:30

## 2021-12-20 RX ADMIN — Medication 650 MILLIGRAM(S): at 06:09

## 2021-12-20 RX ADMIN — HEPARIN SODIUM 5000 UNIT(S): 5000 INJECTION INTRAVENOUS; SUBCUTANEOUS at 13:10

## 2021-12-20 RX ADMIN — HEPARIN SODIUM 5000 UNIT(S): 5000 INJECTION INTRAVENOUS; SUBCUTANEOUS at 06:09

## 2021-12-20 RX ADMIN — HEPARIN SODIUM 5000 UNIT(S): 5000 INJECTION INTRAVENOUS; SUBCUTANEOUS at 21:57

## 2021-12-20 NOTE — PROGRESS NOTE ADULT - ASSESSMENT
25 yo M with no sign PMH/PSH presenting with appendicitis.    CLD/IVF  Pain/Nausea PRN  Ceft/Flagyl  HSQ/SCD  FANI  AM labs

## 2021-12-20 NOTE — PROGRESS NOTE ADULT - SUBJECTIVE AND OBJECTIVE BOX
STATUS POST:    : lap appy (ruptired/suppurative) and open cecum excision    24 hours:   O/N: [facility] CLD, +f/+bm, d/c guerrier, passed TOV  : OR - lap appy for Ruptured suppurative appendicitis, POC wnl    SUBJECTIVE: Pt seen and examined at bedside this am by surgery team. Reports feeling hungry. Tolerating diet, pain well controlled. Denies f/n/v/cp/sob.    MEDICATIONS  (STANDING):  acetaminophen    Suspension .. 650 milliGRAM(s) Oral every 6 hours  dextrose 5% + sodium chloride 0.9%. 1000 milliLiter(s) (110 mL/Hr) IV Continuous <Continuous>  fluconAZOLE IVPB 400 milliGRAM(s) IV Intermittent every 24 hours  fluconAZOLE IVPB      heparin   Injectable 5000 Unit(s) SubCutaneous every 8 hours  ketorolac   Injectable 15 milliGRAM(s) IV Push every 6 hours  piperacillin/tazobactam IVPB.. 3.375 Gram(s) IV Intermittent every 6 hours    MEDICATIONS  (PRN):  HYDROmorphone  Injectable 1 milliGRAM(s) IV Push every 4 hours PRN Severe Pain (7 - 10)  HYDROmorphone  Injectable 0.5 milliGRAM(s) IV Push every 4 hours PRN Moderate Pain (4 - 6)  ondansetron Injectable 4 milliGRAM(s) IV Push every 6 hours PRN Nausea      Vital Signs Last 24 Hrs  T(C): 36.8 (20 Dec 2021 08:23), Max: 37.1 (19 Dec 2021 12:00)  T(F): 98.3 (20 Dec 2021 08:23), Max: 98.7 (19 Dec 2021 12:00)  HR: 67 (20 Dec 2021 08:23) (56 - 102)  BP: 107/63 (20 Dec 2021 08:23) (10/55 - 120/56)  BP(mean): --  RR: 17 (20 Dec 2021 08:23) (15 - 18)  SpO2: 97% (20 Dec 2021 08:23) (95% - 99%)    Physical Exam  General: NAD, resting comfortably in bed  Pulmonary: Nonlabored breathing, no respiratory distress  CV: NSR  Abd: soft, appropriately tender to palpation, no guarding, incisions c/d/i, FANI - serosang  Extremities: (-) edema, warm, well-perfused      I&O's Detail    19 Dec 2021 07:01  -  20 Dec 2021 07:00  --------------------------------------------------------  IN:    IV PiggyBack: 100 mL    Lactated Ringers: 1485 mL    Oral Fluid: 550 mL  Total IN: 2135 mL    OUT:    Bulb (mL): 365 mL    Indwelling Catheter - Urethral (mL): 775 mL    Open/Penrose (mL): 25 mL    Voided (mL): 250 mL  Total OUT: 1415 mL    Total NET: 720 mL      20 Dec 2021 07:01  -  20 Dec 2021 09:03  --------------------------------------------------------  IN:  Total IN: 0 mL    OUT:    Bulb (mL): 30 mL  Total OUT: 30 mL    Total NET: -30 mL          LABS:                        11.5   17.84 )-----------( 259      ( 20 Dec 2021 07:58 )             34.9     12-20    138  |  105  |  13  ----------------------------<  192<H>  4.0   |  22  |  1.07    Ca    9.0      20 Dec 2021 07:58  Phos  2.5     12-20  Mg     1.8     12-20    TPro  x   /  Alb  x   /  TBili  0.2  /  DBili  x   /  AST  x   /  ALT  x   /  AlkPhos  x   12-20    PT/INR - ( 20 Dec 2021 07:58 )   PT: 13.7 sec;   INR: 1.15          PTT - ( 19 Dec 2021 06:22 )  PTT:57.3 sec  Urinalysis Basic - ( 18 Dec 2021 19:24 )    Color: Yellow / Appearance: Clear / S.020 / pH: x  Gluc: x / Ketone: NEGATIVE  / Bili: NEGATIVE / Urobili: 0.2 E.U./dL   Blood: x / Protein: 100 mg/dL / Nitrite: NEGATIVE   Leuk Esterase: NEGATIVE / RBC: < 5 /HPF / WBC < 5 /HPF   Sq Epi: x / Non Sq Epi: 0-5 /HPF / Bacteria: x        RADIOLOGY & ADDITIONAL STUDIES:

## 2021-12-21 ENCOUNTER — TRANSCRIPTION ENCOUNTER (OUTPATIENT)
Age: 26
End: 2021-12-21

## 2021-12-21 LAB
ANION GAP SERPL CALC-SCNC: 9 MMOL/L — SIGNIFICANT CHANGE UP (ref 5–17)
BUN SERPL-MCNC: 15 MG/DL — SIGNIFICANT CHANGE UP (ref 7–23)
CALCIUM SERPL-MCNC: 9 MG/DL — SIGNIFICANT CHANGE UP (ref 8.4–10.5)
CHLORIDE SERPL-SCNC: 103 MMOL/L — SIGNIFICANT CHANGE UP (ref 96–108)
CO2 SERPL-SCNC: 24 MMOL/L — SIGNIFICANT CHANGE UP (ref 22–31)
CREAT SERPL-MCNC: 0.98 MG/DL — SIGNIFICANT CHANGE UP (ref 0.5–1.3)
GLUCOSE SERPL-MCNC: 134 MG/DL — HIGH (ref 70–99)
HCT VFR BLD CALC: 34.6 % — LOW (ref 39–50)
HGB BLD-MCNC: 11.4 G/DL — LOW (ref 13–17)
MAGNESIUM SERPL-MCNC: 1.9 MG/DL — SIGNIFICANT CHANGE UP (ref 1.6–2.6)
MCHC RBC-ENTMCNC: 30.1 PG — SIGNIFICANT CHANGE UP (ref 27–34)
MCHC RBC-ENTMCNC: 32.9 GM/DL — SIGNIFICANT CHANGE UP (ref 32–36)
MCV RBC AUTO: 91.3 FL — SIGNIFICANT CHANGE UP (ref 80–100)
NRBC # BLD: 0 /100 WBCS — SIGNIFICANT CHANGE UP (ref 0–0)
PHOSPHATE SERPL-MCNC: 3 MG/DL — SIGNIFICANT CHANGE UP (ref 2.5–4.5)
PLATELET # BLD AUTO: 287 K/UL — SIGNIFICANT CHANGE UP (ref 150–400)
POTASSIUM SERPL-MCNC: 3.8 MMOL/L — SIGNIFICANT CHANGE UP (ref 3.5–5.3)
POTASSIUM SERPL-SCNC: 3.8 MMOL/L — SIGNIFICANT CHANGE UP (ref 3.5–5.3)
RBC # BLD: 3.79 M/UL — LOW (ref 4.2–5.8)
RBC # FLD: 12.7 % — SIGNIFICANT CHANGE UP (ref 10.3–14.5)
SODIUM SERPL-SCNC: 136 MMOL/L — SIGNIFICANT CHANGE UP (ref 135–145)
WBC # BLD: 12.83 K/UL — HIGH (ref 3.8–10.5)
WBC # FLD AUTO: 12.83 K/UL — HIGH (ref 3.8–10.5)

## 2021-12-21 PROCEDURE — 74019 RADEX ABDOMEN 2 VIEWS: CPT | Mod: 26

## 2021-12-21 PROCEDURE — 93010 ELECTROCARDIOGRAM REPORT: CPT

## 2021-12-21 PROCEDURE — 74177 CT ABD & PELVIS W/CONTRAST: CPT | Mod: 26

## 2021-12-21 PROCEDURE — 99232 SBSQ HOSP IP/OBS MODERATE 35: CPT | Mod: GC

## 2021-12-21 RX ORDER — ACETAMINOPHEN 500 MG
1000 TABLET ORAL ONCE
Refills: 0 | Status: COMPLETED | OUTPATIENT
Start: 2021-12-21 | End: 2021-12-21

## 2021-12-21 RX ORDER — FAMOTIDINE 10 MG/ML
20 INJECTION INTRAVENOUS DAILY
Refills: 0 | Status: DISCONTINUED | OUTPATIENT
Start: 2021-12-21 | End: 2021-12-23

## 2021-12-21 RX ORDER — SODIUM CHLORIDE 9 MG/ML
1000 INJECTION, SOLUTION INTRAVENOUS
Refills: 0 | Status: DISCONTINUED | OUTPATIENT
Start: 2021-12-21 | End: 2021-12-22

## 2021-12-21 RX ORDER — MAGNESIUM SULFATE 500 MG/ML
1 VIAL (ML) INJECTION ONCE
Refills: 0 | Status: COMPLETED | OUTPATIENT
Start: 2021-12-21 | End: 2021-12-21

## 2021-12-21 RX ORDER — IOHEXOL 300 MG/ML
30 INJECTION, SOLUTION INTRAVENOUS ONCE
Refills: 0 | Status: COMPLETED | OUTPATIENT
Start: 2021-12-21 | End: 2021-12-21

## 2021-12-21 RX ORDER — POTASSIUM CHLORIDE 20 MEQ
20 PACKET (EA) ORAL ONCE
Refills: 0 | Status: COMPLETED | OUTPATIENT
Start: 2021-12-21 | End: 2021-12-21

## 2021-12-21 RX ADMIN — Medication 650 MILLIGRAM(S): at 17:10

## 2021-12-21 RX ADMIN — PIPERACILLIN AND TAZOBACTAM 200 GRAM(S): 4; .5 INJECTION, POWDER, LYOPHILIZED, FOR SOLUTION INTRAVENOUS at 00:30

## 2021-12-21 RX ADMIN — Medication 100 GRAM(S): at 09:09

## 2021-12-21 RX ADMIN — OXYCODONE HYDROCHLORIDE 5 MILLIGRAM(S): 5 TABLET ORAL at 22:25

## 2021-12-21 RX ADMIN — Medication 650 MILLIGRAM(S): at 16:40

## 2021-12-21 RX ADMIN — OXYCODONE HYDROCHLORIDE 5 MILLIGRAM(S): 5 TABLET ORAL at 04:00

## 2021-12-21 RX ADMIN — Medication 15 MILLIGRAM(S): at 05:05

## 2021-12-21 RX ADMIN — OXYCODONE HYDROCHLORIDE 5 MILLIGRAM(S): 5 TABLET ORAL at 03:09

## 2021-12-21 RX ADMIN — Medication 15 MILLIGRAM(S): at 17:35

## 2021-12-21 RX ADMIN — Medication 650 MILLIGRAM(S): at 11:53

## 2021-12-21 RX ADMIN — PIPERACILLIN AND TAZOBACTAM 200 GRAM(S): 4; .5 INJECTION, POWDER, LYOPHILIZED, FOR SOLUTION INTRAVENOUS at 07:02

## 2021-12-21 RX ADMIN — Medication 15 MILLIGRAM(S): at 11:23

## 2021-12-21 RX ADMIN — Medication 650 MILLIGRAM(S): at 11:23

## 2021-12-21 RX ADMIN — PIPERACILLIN AND TAZOBACTAM 200 GRAM(S): 4; .5 INJECTION, POWDER, LYOPHILIZED, FOR SOLUTION INTRAVENOUS at 18:22

## 2021-12-21 RX ADMIN — OXYCODONE HYDROCHLORIDE 5 MILLIGRAM(S): 5 TABLET ORAL at 16:20

## 2021-12-21 RX ADMIN — Medication 400 MILLIGRAM(S): at 23:44

## 2021-12-21 RX ADMIN — HEPARIN SODIUM 5000 UNIT(S): 5000 INJECTION INTRAVENOUS; SUBCUTANEOUS at 23:47

## 2021-12-21 RX ADMIN — HEPARIN SODIUM 5000 UNIT(S): 5000 INJECTION INTRAVENOUS; SUBCUTANEOUS at 05:05

## 2021-12-21 RX ADMIN — Medication 20 MILLIEQUIVALENT(S): at 09:09

## 2021-12-21 RX ADMIN — FAMOTIDINE 20 MILLIGRAM(S): 10 INJECTION INTRAVENOUS at 03:30

## 2021-12-21 RX ADMIN — IOHEXOL 30 MILLILITER(S): 300 INJECTION, SOLUTION INTRAVENOUS at 17:20

## 2021-12-21 RX ADMIN — OXYCODONE HYDROCHLORIDE 5 MILLIGRAM(S): 5 TABLET ORAL at 23:30

## 2021-12-21 RX ADMIN — Medication 650 MILLIGRAM(S): at 06:00

## 2021-12-21 RX ADMIN — Medication 15 MILLIGRAM(S): at 00:30

## 2021-12-21 RX ADMIN — HEPARIN SODIUM 5000 UNIT(S): 5000 INJECTION INTRAVENOUS; SUBCUTANEOUS at 13:03

## 2021-12-21 RX ADMIN — SODIUM CHLORIDE 75 MILLILITER(S): 9 INJECTION, SOLUTION INTRAVENOUS at 17:20

## 2021-12-21 RX ADMIN — Medication 650 MILLIGRAM(S): at 05:05

## 2021-12-21 RX ADMIN — Medication 15 MILLIGRAM(S): at 17:20

## 2021-12-21 RX ADMIN — PIPERACILLIN AND TAZOBACTAM 200 GRAM(S): 4; .5 INJECTION, POWDER, LYOPHILIZED, FOR SOLUTION INTRAVENOUS at 13:03

## 2021-12-21 RX ADMIN — Medication 15 MILLIGRAM(S): at 00:45

## 2021-12-21 RX ADMIN — Medication 15 MILLIGRAM(S): at 05:20

## 2021-12-21 RX ADMIN — OXYCODONE HYDROCHLORIDE 5 MILLIGRAM(S): 5 TABLET ORAL at 16:55

## 2021-12-21 RX ADMIN — FLUCONAZOLE 100 MILLIGRAM(S): 150 TABLET ORAL at 10:47

## 2021-12-21 RX ADMIN — ONDANSETRON 4 MILLIGRAM(S): 8 TABLET, FILM COATED ORAL at 17:20

## 2021-12-21 RX ADMIN — Medication 15 MILLIGRAM(S): at 23:44

## 2021-12-21 RX ADMIN — Medication 15 MILLIGRAM(S): at 11:38

## 2021-12-21 NOTE — DISCHARGE NOTE PROVIDER - CARE PROVIDER_API CALL
Roberto Dela Cruz)  Surgery  [address] [place] [address]  Phone: [phone]  Fax: [phone]  Follow Up Time:

## 2021-12-21 NOTE — PROGRESS NOTE ADULT - SUBJECTIVE AND OBJECTIVE BOX
Patient is a 26y old  Male who presents with a chief complaint of Acute appendicitis  (21 Dec 2021 09:49)    INTERVAL EVENTS:  - Interval improvement in abdominal discomfort.   - No nausea today. Tolerated liquids ; Advanced to regular texture diet  - Ambulated in hallway   - Urinating without dysuria  - has had one bowel movement since midnight    SUBJECTIVE:  Patient was seen and examined at bedside.  Review of systems: Patient denies: fever, chills, dizziness, HA, Changes in vision, CP, dyspnea, nausea or vomiting, dysuria, LE edema. Rest of 12 point Review of systems negative unless otherwise documented elsewhere in note.     Diet, Regular (12-20-21 @ 09:57) [Active]    MEDICATIONS:  MEDICATIONS  (STANDING):  acetaminophen    Suspension .. 650 milliGRAM(s) Oral every 6 hours  famotidine    Tablet 20 milliGRAM(s) Oral daily  fluconAZOLE IVPB      fluconAZOLE IVPB 400 milliGRAM(s) IV Intermittent every 24 hours  heparin   Injectable 5000 Unit(s) SubCutaneous every 8 hours  ketorolac   Injectable 15 milliGRAM(s) IV Push every 6 hours  piperacillin/tazobactam IVPB.. 3.375 Gram(s) IV Intermittent every 6 hours    MEDICATIONS  (PRN):  ondansetron Injectable 4 milliGRAM(s) IV Push every 6 hours PRN Nausea  oxyCODONE    IR 5 milliGRAM(s) Oral every 4 hours PRN Moderate Pain (4 - 6)    Allergies    cats and dogs (Pruritus)  No Known Drug Allergies  Shrimp (Hives)    Intolerances      OBJECTIVE:  Vital Signs Last 24 Hrs  T(C): 36.7 (21 Dec 2021 13:48), Max: 36.7 (20 Dec 2021 17:30)  T(F): 98.1 (21 Dec 2021 13:48), Max: 98.1 (20 Dec 2021 20:40)  HR: 56 (21 Dec 2021 13:48) (46 - 56)  BP: 113/67 (21 Dec 2021 13:48) (101/60 - 113/67)  BP(mean): 70 (21 Dec 2021 05:00) (70 - 70)  RR: 18 (21 Dec 2021 13:48) (16 - 18)  SpO2: 98% (21 Dec 2021 13:48) (96% - 98%)  I&O's Summary    20 Dec 2021 07:01  -  21 Dec 2021 07:00  --------------------------------------------------------  IN: 2480 mL / OUT: 798 mL / NET: 1682 mL    21 Dec 2021 07:01  -  21 Dec 2021 14:55  --------------------------------------------------------  IN: 660 mL / OUT: 910 mL / NET: -250 mL    PHYSICAL EXAM:  Gen: Reclining in bed at time of exam, appears stated age  HEENT: NCAT, MMM, clear OP  Neck: supple, trachea at midline  CV: RRR, +S1/S2  Pulm: adequate respiratory effort, no increase in work of breathing  Abd: Tender to palpation throughout. Mild distension. ; Abdominal incision dressed in gauze, clean , dry ; drain in place with serosanguinous output.   Skin: warm and dry, no new rashes vs prior report  Ext: WWP, no LE edema  Neuro: AOx3, no gross focal neurological deficits  Psych: affect and behavior appropriate, pleasant at time of interview    LABS:                        11.4   12.83 )-----------( 287      ( 21 Dec 2021 07:47 )             34.6     12-21    136  |  103  |  15  ----------------------------<  134<H>  3.8   |  24  |  0.98    Ca    9.0      21 Dec 2021 07:47  Phos  3.0     12-21  Mg     1.9     12-21    TPro  x   /  Alb  x   /  TBili  0.2  /  DBili  x   /  AST  x   /  ALT  x   /  AlkPhos  x   12-20      PT/INR - ( 20 Dec 2021 07:58 )   PT: 13.7 sec;   INR: 1.15            CAPILLARY BLOOD GLUCOSE            MICRODATA:    Culture - Abscess with Gram Stain (collected 19 Dec 2021 11:41)  Source: .Abscess OR-PELVIC ABSCESS CULTURE  Gram Stain (20 Dec 2021 07:11):    Numerous white blood cells    Few Gram positive cocci in pairs  Preliminary Report (21 Dec 2021 14:47):    Few Streptococcus intermedius    Susceptibility to follow.    Culture - Blood (collected 19 Dec 2021 09:39)  Source: .Blood Blood  Preliminary Report (21 Dec 2021 10:00):    No growth at 2 days.    Culture - Blood (collected 19 Dec 2021 09:39)  Source: .Blood Blood  Preliminary Report (21 Dec 2021 10:00):    No growth at 2 days.    Culture - Urine (collected 19 Dec 2021 00:46)  Source: Clean Catch Clean Catch (Midstream)  Final Report (19 Dec 2021 22:11):    No growth        RADIOLOGY/OTHER STUDIES:

## 2021-12-21 NOTE — PROGRESS NOTE ADULT - ASSESSMENT
26 year old man admitted for acute appendicitis complicated by sepsis     # Sepsis (present on admission)  At time of arrival to [facility], met at least 2/4 SIRS criteria. WBCs 22, HR >90bpm,  Source: Likely 2/2 acute appendicitis with perforation   Improved with appendectomy and empiric antibiotics   - OR culture growing streptococcus intermedius (type of streptococcus anginosus) ; susceptibilities pending, but per [facility] antibiogram, likely susceptible to penicillins - agree with continuing zosyn     # Acute appendicitis  s/p appendectomy   Advance diet as tolerated   Rest of care per primary team     # post-operative state  Encourage ambulation   Encourage incentive spirometry   Recommend miralax qd if patient develops constipation post-op    # Acute blood loss anemia   Hgb downtrended from time of admission  Hemoglobin: 11.4 g/dL (12-21-21 @ 07:47)  Hemoglobin: 14.5 g/dL (12-18-21 @ 19:02)  Partially attributable to operative blood losses and losses from drain   continue to trend daily CBC

## 2021-12-21 NOTE — DISCHARGE NOTE PROVIDER - NSDCCPTREATMENT_GEN_ALL_CORE_FT
PRINCIPAL PROCEDURE  Procedure: Appendectomy, laparoscopic, with open cecum excision  Findings and Treatment:        PRINCIPAL PROCEDURE  Procedure: Appendectomy, laparoscopic, with open cecum excision  Findings and Treatment:       SECONDARY PROCEDURE  Procedure: Peritoneal lavage  Findings and Treatment:

## 2021-12-21 NOTE — DISCHARGE NOTE PROVIDER - NSDCCPCAREPLAN_GEN_ALL_CORE_FT
PRINCIPAL DISCHARGE DIAGNOSIS  Diagnosis: Acute appendicitis  Assessment and Plan of Treatment:        PRINCIPAL DISCHARGE DIAGNOSIS  Diagnosis: Acute appendicitis  Assessment and Plan of Treatment:       SECONDARY DISCHARGE DIAGNOSES  Diagnosis: Sepsis  Assessment and Plan of Treatment:

## 2021-12-21 NOTE — PROGRESS NOTE ADULT - SUBJECTIVE AND OBJECTIVE BOX
24 hr events: tolerating regular diet    SUBJECTIVE: Patient seen at bedside with chief resident, Passing gas and having bowel movements. Pain controlled, No nausea/vomiting.     Vital Signs Last 24 Hrs  T(C): 36.6 (21 Dec 2021 05:00), Max: 36.8 (20 Dec 2021 08:23)  T(F): 97.9 (21 Dec 2021 05:00), Max: 98.3 (20 Dec 2021 08:23)  HR: 46 (21 Dec 2021 05:00) (46 - 67)  BP: 109/50 (21 Dec 2021 05:00) (101/60 - 109/50)  BP(mean): 70 (21 Dec 2021 05:00) (70 - 70)  RR: 18 (21 Dec 2021 05:00) (17 - 18)  SpO2: 97% (21 Dec 2021 05:00) (96% - 97%)    Physical Exam  General: NAD, resting comfortably in bed  Pulmonary: Nonlabored breathing, no respiratory distress  CV: NSR  Abd: soft, appropriately tender to palpation, no guarding, incisions c/d/i, FANI - serosang  Extremities: (-) edema, warm, well-perfused    Lines/drains/tubes:    I&O's Summary    20 Dec 2021 07:01  -  21 Dec 2021 07:00  --------------------------------------------------------  IN: 2380 mL / OUT: 798 mL / NET: 1582 mL        LABS:                        11.4   12.83 )-----------( 287      ( 21 Dec 2021 07:47 )             34.6     12-20    138  |  105  |  13  ----------------------------<  192<H>  4.0   |  22  |  1.07    Ca    9.0      20 Dec 2021 07:58  Phos  2.5     12-20  Mg     1.8     12-20    TPro  x   /  Alb  x   /  TBili  0.2  /  DBili  x   /  AST  x   /  ALT  x   /  AlkPhos  x   12-20    PT/INR - ( 20 Dec 2021 07:58 )   PT: 13.7 sec;   INR: 1.15              CAPILLARY BLOOD GLUCOSE            RADIOLOGY & ADDITIONAL STUDIES:

## 2021-12-21 NOTE — PROGRESS NOTE ADULT - ASSESSMENT
25 yo M with no sign PMH/PSH presenting with appendicitis.    CLD/IVF  Pain/Nausea PRN  Ceft/Flagyl  HSQ/SCD  FANI  AM labs  Adv diet today 27 yo M with no sign PMH/PSH presenting with appendicitis. Now s/p lap converted to open appendectomy w/ cecum excision.    CLD/IVF  Pain/Nausea PRN  Ceft/Flagyl  HSQ/SCD  FANI  AM labs  Adv diet today

## 2021-12-21 NOTE — DISCHARGE NOTE PROVIDER - NSDCMRMEDTOKEN_GEN_ALL_CORE_FT
cefpodoxime 200 mg oral tablet: 1 tab(s) orally every 12 hours  metroNIDAZOLE 500 mg oral tablet: 1 tab(s) orally every 12 hours

## 2021-12-21 NOTE — DISCHARGE NOTE PROVIDER - HOSPITAL COURSE
25 yo M with no sign PMH/PSH presenting with one day of abdominal pain. Associated with nausea, 1 episode of NBNB emesis, diarrhea, fever/chills, decreased appetite. He reports that he had difficulty urinating the day prior to admission which had resolved. On 12/19, he underwent laparoscopic appendectomy with an open cecum excision and was transferred to the PACU in stable condition. He has had no other complications on this admission and will be discharged in stable condition with FANI drain in RLQ      27 yo M with no sign PMH/PSH presenting with one day of abdominal pain. Associated with nausea, 1 episode of NBNB emesis, diarrhea, fever/chills, decreased appetite. He reports that he had difficulty urinating the day prior to admission which had resolved. On 12/19, he underwent laparoscopic appendectomy with an open cecum excision and was transferred to the PACU in stable condition. He subsequently developed diarrhea, fever, and abdominal distention and was found on imaging to have signs suspicious of abscess. He was taken to the OR on 12/23 for abdominal washout and placement of a FANI drain. He improved considerably and is discharge in stable condition without pain, fever, nausea, vomiting, or other localizing symptoms. His would appears well-healed and he is instructed to follow up on an outpatient basis.

## 2021-12-21 NOTE — DISCHARGE NOTE PROVIDER - NSDCFUADDINST_GEN_ALL_CORE_FT
General Discharge Instructions:  Please resume all regular home medications unless specifically advised not to take a particular medication. Also, please take any new medications as prescribed.  Please get plenty of rest, continue to ambulate several times per day, and drink adequate amounts of fluids. Avoid lifting weights greater than 5-10 lbs until you follow-up with your surgeon, who will instruct you further regarding activity restrictions.  Avoid driving or operating heavy machinery while taking pain medications.  Please follow-up with your surgeon Dr. Dela Cruz by calling [phone] to make an appointment in the next week and please follow up with your Primary Care Provider (PCP) as advised.    Incision Care:  *Please call your doctor if you have increased pain, swelling, redness, or drainage from the incision site.  *Avoid swimming and baths until your follow-up appointment.  *You may shower, and wash surgical incisions with a mild soap and warm water. Gently pat the area dry.  *If you have staples, they will be removed at your follow-up appointment.  *If you have steri-strips, they will fall off on their own. Please remove any remaining strips 7-10 days after surgery.     FANI Drain Care:  *Please look at the site every day for signs of infection (increased redness or pain, swelling, odor, yellow or bloody discharge, warm to touch, fever).  *Maintain suction of the bulb.  *Note color, consistency, and amount of fluid in the drain. Call the doctor, nurse practitioner, or VNA nurse if the amount increases significantly or changes in character.  *Be sure to empty the drain frequently. Record the output, if instructed to do so.  *You may shower; wash the area gently with warm, soapy water.  *Keep the insertion site clean and dry otherwise.  *Avoid swimming, baths, hot tubs; do not submerge yourself in water.  *Make sure to keep the drain attached securely to your body to prevent pulling or dislocation.     Warning Signs:  Please call your doctor if you experience the following:  *You experience new chest pain, pressure, squeezing or tightness.  *New or worsening cough, shortness of breath, or wheeze.  *If you are vomiting and cannot keep down fluids or your medications.  *You are getting dehydrated due to continued vomiting, diarrhea, or other reasons. Signs of dehydration include dry mouth, rapid heartbeat, or feeling dizzy or faint when standing.  *You see blood or dark/black material when you vomit or have a bowel movement.  *You experience burning when you urinate, have blood in your urine, or experience a discharge.  *Your pain is not improving within 8-12 hours or is not gone within 24 hours. Call or return immediately if your pain is getting worse, changes location, or moves to your chest or back.  *You have shaking chills, or fever greater than 101.5 degrees Fahrenheit or 38 degrees Celsius.  *Any change in your symptoms, or any new symptoms that concern you.

## 2021-12-22 ENCOUNTER — TRANSCRIPTION ENCOUNTER (OUTPATIENT)
Age: 26
End: 2021-12-22

## 2021-12-22 LAB
ANION GAP SERPL CALC-SCNC: 12 MMOL/L — SIGNIFICANT CHANGE UP (ref 5–17)
APPEARANCE UR: CLEAR — SIGNIFICANT CHANGE UP
BILIRUB UR-MCNC: NEGATIVE — SIGNIFICANT CHANGE UP
BUN SERPL-MCNC: 11 MG/DL — SIGNIFICANT CHANGE UP (ref 7–23)
CALCIUM SERPL-MCNC: 8.6 MG/DL — SIGNIFICANT CHANGE UP (ref 8.4–10.5)
CHLORIDE SERPL-SCNC: 102 MMOL/L — SIGNIFICANT CHANGE UP (ref 96–108)
CO2 SERPL-SCNC: 24 MMOL/L — SIGNIFICANT CHANGE UP (ref 22–31)
COLOR SPEC: YELLOW — SIGNIFICANT CHANGE UP
CREAT SERPL-MCNC: 1.06 MG/DL — SIGNIFICANT CHANGE UP (ref 0.5–1.3)
DIFF PNL FLD: NEGATIVE — SIGNIFICANT CHANGE UP
GLUCOSE SERPL-MCNC: 111 MG/DL — HIGH (ref 70–99)
GLUCOSE UR QL: NEGATIVE — SIGNIFICANT CHANGE UP
GRAM STN FLD: SIGNIFICANT CHANGE UP
HCT VFR BLD CALC: 34.6 % — LOW (ref 39–50)
HGB BLD-MCNC: 11.1 G/DL — LOW (ref 13–17)
KETONES UR-MCNC: NEGATIVE — SIGNIFICANT CHANGE UP
LEUKOCYTE ESTERASE UR-ACNC: NEGATIVE — SIGNIFICANT CHANGE UP
MAGNESIUM SERPL-MCNC: 1.8 MG/DL — SIGNIFICANT CHANGE UP (ref 1.6–2.6)
MCHC RBC-ENTMCNC: 29.1 PG — SIGNIFICANT CHANGE UP (ref 27–34)
MCHC RBC-ENTMCNC: 32.1 GM/DL — SIGNIFICANT CHANGE UP (ref 32–36)
MCV RBC AUTO: 90.8 FL — SIGNIFICANT CHANGE UP (ref 80–100)
NITRITE UR-MCNC: NEGATIVE — SIGNIFICANT CHANGE UP
NRBC # BLD: 0 /100 WBCS — SIGNIFICANT CHANGE UP (ref 0–0)
PH UR: 6.5 — SIGNIFICANT CHANGE UP (ref 5–8)
PHOSPHATE SERPL-MCNC: 3.9 MG/DL — SIGNIFICANT CHANGE UP (ref 2.5–4.5)
PLATELET # BLD AUTO: 285 K/UL — SIGNIFICANT CHANGE UP (ref 150–400)
POTASSIUM SERPL-MCNC: 3.5 MMOL/L — SIGNIFICANT CHANGE UP (ref 3.5–5.3)
POTASSIUM SERPL-SCNC: 3.5 MMOL/L — SIGNIFICANT CHANGE UP (ref 3.5–5.3)
PROT UR-MCNC: NEGATIVE MG/DL — SIGNIFICANT CHANGE UP
RBC # BLD: 3.81 M/UL — LOW (ref 4.2–5.8)
RBC # FLD: 12.7 % — SIGNIFICANT CHANGE UP (ref 10.3–14.5)
SARS-COV-2 RNA SPEC QL NAA+PROBE: SIGNIFICANT CHANGE UP
SODIUM SERPL-SCNC: 138 MMOL/L — SIGNIFICANT CHANGE UP (ref 135–145)
SP GR SPEC: <=1.005 — SIGNIFICANT CHANGE UP (ref 1–1.03)
SPECIMEN SOURCE: SIGNIFICANT CHANGE UP
UROBILINOGEN FLD QL: 0.2 E.U./DL — SIGNIFICANT CHANGE UP
WBC # BLD: 20.67 K/UL — HIGH (ref 3.8–10.5)
WBC # FLD AUTO: 20.67 K/UL — HIGH (ref 3.8–10.5)

## 2021-12-22 PROCEDURE — 99233 SBSQ HOSP IP/OBS HIGH 50: CPT

## 2021-12-22 PROCEDURE — 49406 IMAGE CATH FLUID PERI/RETRO: CPT

## 2021-12-22 PROCEDURE — 71045 X-RAY EXAM CHEST 1 VIEW: CPT | Mod: 26

## 2021-12-22 RX ORDER — ACETAMINOPHEN 500 MG
1000 TABLET ORAL ONCE
Refills: 0 | Status: COMPLETED | OUTPATIENT
Start: 2021-12-22 | End: 2021-12-22

## 2021-12-22 RX ORDER — SODIUM CHLORIDE 9 MG/ML
1000 INJECTION, SOLUTION INTRAVENOUS
Refills: 0 | Status: DISCONTINUED | OUTPATIENT
Start: 2021-12-22 | End: 2021-12-23

## 2021-12-22 RX ORDER — OXYCODONE HYDROCHLORIDE 5 MG/1
5 TABLET ORAL ONCE
Refills: 0 | Status: DISCONTINUED | OUTPATIENT
Start: 2021-12-22 | End: 2021-12-22

## 2021-12-22 RX ORDER — POTASSIUM CHLORIDE 20 MEQ
10 PACKET (EA) ORAL
Refills: 0 | Status: COMPLETED | OUTPATIENT
Start: 2021-12-22 | End: 2021-12-22

## 2021-12-22 RX ORDER — DIPHENHYDRAMINE HCL 50 MG
25 CAPSULE ORAL ONCE
Refills: 0 | Status: COMPLETED | OUTPATIENT
Start: 2021-12-22 | End: 2021-12-22

## 2021-12-22 RX ORDER — MAGNESIUM SULFATE 500 MG/ML
2 VIAL (ML) INJECTION ONCE
Refills: 0 | Status: COMPLETED | OUTPATIENT
Start: 2021-12-22 | End: 2021-12-22

## 2021-12-22 RX ORDER — BENZOCAINE AND MENTHOL 5; 1 G/100ML; G/100ML
1 LIQUID ORAL THREE TIMES A DAY
Refills: 0 | Status: DISCONTINUED | OUTPATIENT
Start: 2021-12-22 | End: 2021-12-22

## 2021-12-22 RX ADMIN — Medication 100 MILLIEQUIVALENT(S): at 19:15

## 2021-12-22 RX ADMIN — Medication 100 MILLIEQUIVALENT(S): at 16:04

## 2021-12-22 RX ADMIN — Medication 100 MILLIEQUIVALENT(S): at 11:31

## 2021-12-22 RX ADMIN — Medication 25 GRAM(S): at 09:38

## 2021-12-22 RX ADMIN — Medication 15 MILLIGRAM(S): at 06:15

## 2021-12-22 RX ADMIN — PIPERACILLIN AND TAZOBACTAM 200 GRAM(S): 4; .5 INJECTION, POWDER, LYOPHILIZED, FOR SOLUTION INTRAVENOUS at 12:56

## 2021-12-22 RX ADMIN — Medication 1000 MILLIGRAM(S): at 07:15

## 2021-12-22 RX ADMIN — Medication 25 MILLIGRAM(S): at 20:54

## 2021-12-22 RX ADMIN — HEPARIN SODIUM 5000 UNIT(S): 5000 INJECTION INTRAVENOUS; SUBCUTANEOUS at 06:46

## 2021-12-22 RX ADMIN — Medication 15 MILLIGRAM(S): at 06:30

## 2021-12-22 RX ADMIN — Medication 15 MILLIGRAM(S): at 00:15

## 2021-12-22 RX ADMIN — PIPERACILLIN AND TAZOBACTAM 200 GRAM(S): 4; .5 INJECTION, POWDER, LYOPHILIZED, FOR SOLUTION INTRAVENOUS at 00:27

## 2021-12-22 RX ADMIN — Medication 1000 MILLIGRAM(S): at 22:00

## 2021-12-22 RX ADMIN — Medication 400 MILLIGRAM(S): at 06:45

## 2021-12-22 RX ADMIN — PIPERACILLIN AND TAZOBACTAM 200 GRAM(S): 4; .5 INJECTION, POWDER, LYOPHILIZED, FOR SOLUTION INTRAVENOUS at 19:14

## 2021-12-22 RX ADMIN — Medication 400 MILLIGRAM(S): at 21:41

## 2021-12-22 RX ADMIN — PIPERACILLIN AND TAZOBACTAM 200 GRAM(S): 4; .5 INJECTION, POWDER, LYOPHILIZED, FOR SOLUTION INTRAVENOUS at 06:15

## 2021-12-22 RX ADMIN — Medication 1000 MILLIGRAM(S): at 00:15

## 2021-12-22 NOTE — PROGRESS NOTE ADULT - SUBJECTIVE AND OBJECTIVE BOX
Patient is a 26y old  Male who presents with a chief complaint of perforated appendicitis (22 Dec 2021 10:55)      INTERVAL HPI/OVERNIGHT EVENTS:    Pt. seen and examined earlier today  Pt. reports his abdomen feels less distended today  Diarrhea resolved; no BM yet today; +flatus O/N  Fevers resolved as well; Pt. denies chills    Review of Systems: 12 point review of systems otherwise negative    MEDICATIONS  (STANDING):  acetaminophen    Suspension .. 650 milliGRAM(s) Oral every 6 hours  famotidine    Tablet 20 milliGRAM(s) Oral daily  lactated ringers. 1000 milliLiter(s) (120 mL/Hr) IV Continuous <Continuous>  piperacillin/tazobactam IVPB.. 3.375 Gram(s) IV Intermittent every 6 hours  potassium chloride  10 mEq/100 mL IVPB 10 milliEquivalent(s) IV Intermittent every 1 hour    MEDICATIONS  (PRN):  ondansetron Injectable 4 milliGRAM(s) IV Push every 6 hours PRN Nausea      Allergies    cats and dogs (Pruritus)  No Known Drug Allergies  Shrimp (Hives)    Intolerances          Vital Signs Last 24 Hrs  T(C): 36.8 (22 Dec 2021 09:17), Max: 39.6 (21 Dec 2021 16:31)  T(F): 98.2 (22 Dec 2021 09:17), Max: 103.2 (21 Dec 2021 16:31)  HR: 54 (22 Dec 2021 09:17) (54 - 76)  BP: 97/57 (22 Dec 2021 09:17) (97/57 - 157/73)  BP(mean): --  RR: 17 (22 Dec 2021 09:17) (16 - 18)  SpO2: 95% (22 Dec 2021 09:17) (95% - 98%)  CAPILLARY BLOOD GLUCOSE          12-21 @ 07:01  -  12-22 @ 07:00  --------------------------------------------------------  IN: 2172.6 mL / OUT: 3120 mL / NET: -947.4 mL    12-22 @ 07:01  -  12-22 @ 11:34  --------------------------------------------------------  IN: 0 mL / OUT: 300 mL / NET: -300 mL        Physical Exam:  (earlier today)  Daily     Daily   General:  non-toxic appearing in NAD, sitting in a chair  HEENT:  +NGT  CV:  RRR  Lungs:  CTA B/L  Abdomen:  less distended and tender (per Pt.)  Extremities:  no edema B/L LE  Skin:  WWP  Neuro:  AAOx3    LABS:                        11.1   20.67 )-----------( 285      ( 22 Dec 2021 06:56 )             34.6     12-22    138  |  102  |  11  ----------------------------<  111<H>  3.5   |  24  |  1.06    Ca    8.6      22 Dec 2021 06:56  Phos  3.9     12-22  Mg     1.8     12-22        Urinalysis Basic - ( 22 Dec 2021 07:37 )    Color: Yellow / Appearance: Clear / SG: <=1.005 / pH: x  Gluc: x / Ketone: NEGATIVE  / Bili: Negative / Urobili: 0.2 E.U./dL   Blood: x / Protein: NEGATIVE mg/dL / Nitrite: NEGATIVE   Leuk Esterase: NEGATIVE / RBC: x / WBC x   Sq Epi: x / Non Sq Epi: x / Bacteria: x

## 2021-12-22 NOTE — CONSULT NOTE ADULT - SUBJECTIVE AND OBJECTIVE BOX
HPI:  25 yo M with no sign PMH/PSH presenting with one day of abdominal pain. Associated with nausea, 1 episode of NBNB emesis, diarrhea, fever/chills, decreased appetite. He reports that he had difficulty urinating yesterday - which resolved. Of note he had similar abd pain on Tuesday but pain resolved until Friday night.   Colonoscopy - never    ED: Afebrile. soft BP - responded to bolus. WBC 22.5, Cr = 1.37, CT - appendix measuring up to 1.1 cm in diameter, no appendicolith    Underwent appendectomy on 21 (laparoscopic converted to open) ,  -- suppurative perforation of appendix seen intraop.     At time of evaluation by internal medicine service afternoon of , patient was post - op. With some abdominal soreness, Without nausea. Had yet to pass gas or urinate.     PMH: partial fusion of L5  PSH: wisdom teeth removal  Family history: Ulcerative colitis in father ; No colon cancer in father or in mother   Medications : no home meds  Allergies: No know drug allergies   Soc: non smoking, ETOH socially 5-10 drinks on the weekend, no recreational drug use   (19 Dec 2021 07:23)    PAST MEDICAL & SURGICAL HISTORY:  No pertinent past medical history    History of wisdom tooth extraction    Home Medications:    Allergies    cats and dogs (Pruritus)  No Known Drug Allergies  Shrimp (Hives)    Intolerances    FAMILY HISTORY:  Family history of ulcerative colitis (Father)    Social History:  non smoking, ETOH socially 5-10 drinks on the weekend, no recreational drug use (19 Dec 2021 07:23)    REVIEW OF SYSTEMS:  CONSTITUTIONAL: No weight loss  EYES: No eye pain, visual disturbances, or discharge  ENMT:  No difficulty hearing, tinnitus, vertigo; No throat pain  NECK: No pain or stiffness  RESPIRATORY: No cough, wheezing, or hemoptysis; No dyspnea  CARDIOVASCULAR: No chest pain, palpitations, dizziness, or leg swelling  GASTROINTESTINAL: Abdominal symptoms as per HPI  GENITOURINARY: No dysuria, frequency, or hematuria  NEUROLOGICAL: No headaches, memory loss, numbness, or tremors  SKIN: No itching, burning, rashes, or lesions   ENDOCRINE: No heat or cold intolerance;  MUSCULOSKELETAL: No joint pain or swelling;   PSYCHIATRIC: No mood swings, or difficulty sleeping  HEME/LYMPH: No easy bruising, or bleeding gums  ALLERGY AND IMMUNOLOGIC: No hives or eczema    Diet, Regular (21 @ 09:57) [Active]      CURRENT MEDICATIONS:   acetaminophen    Suspension .. 650 milliGRAM(s) Oral every 6 hours  fluconAZOLE IVPB 400 milliGRAM(s) IV Intermittent every 24 hours  fluconAZOLE IVPB      heparin   Injectable 5000 Unit(s) SubCutaneous every 8 hours  ketorolac   Injectable 15 milliGRAM(s) IV Push every 6 hours  ondansetron Injectable 4 milliGRAM(s) IV Push every 6 hours PRN  oxyCODONE    IR 5 milliGRAM(s) Oral every 4 hours PRN  piperacillin/tazobactam IVPB.. 3.375 Gram(s) IV Intermittent every 6 hours      VITAL SIGNS, INS/OUTS (last 24 hours):  Vital Signs Last 24 Hrs  T(C): 36.6 (20 Dec 2021 13:21), Max: 36.9 (19 Dec 2021 17:43)  T(F): 97.8 (20 Dec 2021 13:21), Max: 98.4 (19 Dec 2021 17:43)  HR: 59 (20 Dec 2021 13:21) (56 - 67)  BP: 105/65 (20 Dec 2021 13:21) (99/57 - 107/63)  BP(mean): --  RR: 17 (20 Dec 2021 13:21) (17 - 18)  SpO2: 97% (20 Dec 2021 13:21) (96% - 99%)  I&O's Summary    19 Dec 2021 07:01  -  20 Dec 2021 07:00  --------------------------------------------------------  IN: 2135 mL / OUT: 1415 mL / NET: 720 mL    20 Dec 2021 07:01  -  20 Dec 2021 17:10  --------------------------------------------------------  IN: 1820 mL / OUT: 98 mL / NET: 1722 mL      PHYSICAL EXAM:  Gen: Reclining in bed at time of exam, appears stated age  HEENT: NCAT, MMM, clear OP  Neck: supple, trachea at midline  CV: RRR, +S1/S2  Pulm: adequate respiratory effort, no increase in work of breathing  Abd: soft, thin; ND ; abdominal incision dressed in gauze ; FANI drain in place.   Skin: warm and dry, no new rashes vs prior report  Ext: WWP, no LE edema  Neuro: AOx3, no gross focal neurological deficits  Psych: affect and behavior appropriate, pleasant at time of interview    BASIC LABS:                        11.5   17.84 )-----------( 259      ( 20 Dec 2021 07:58 )             34.9     12-20    138  |  105  |  13  ----------------------------<  192<H>  4.0   |  22  |  1.07    Ca    9.0      20 Dec 2021 07:58  Phos  2.5     12-20  Mg     1.8     12-20    TPro  x   /  Alb  x   /  TBili  0.2  /  DBili  x   /  AST  x   /  ALT  x   /  AlkPhos  x   12-20    PT/INR - ( 20 Dec 2021 07:58 )   PT: 13.7 sec;   INR: 1.15          PTT - ( 19 Dec 2021 06:22 )  PTT:57.3 sec  Urinalysis Basic - ( 18 Dec 2021 19:24 )    Color: Yellow / Appearance: Clear / S.020 / pH: x  Gluc: x / Ketone: NEGATIVE  / Bili: NEGATIVE / Urobili: 0.2 E.U./dL   Blood: x / Protein: 100 mg/dL / Nitrite: NEGATIVE   Leuk Esterase: NEGATIVE / RBC: < 5 /HPF / WBC < 5 /HPF   Sq Epi: x / Non Sq Epi: 0-5 /HPF / Bacteria: x      CAPILLARY BLOOD GLUCOSE          OTHER LABS:        MICRODATA:    Culture - Abscess with Gram Stain (collected 19 Dec 2021 11:41)  Source: .Abscess OR-PELVIC ABSCESS CULTURE  Gram Stain (20 Dec 2021 07:11):    Numerous white blood cells    Few Gram positive cocci in pairs  Preliminary Report (20 Dec 2021 09:46):    Culture in progress    Culture - Blood (collected 19 Dec 2021 09:39)  Source: .Blood Blood  Preliminary Report (20 Dec 2021 10:00):    No growth at 1 day.    Culture - Blood (collected 19 Dec 2021 09:39)  Source: .Blood Blood  Preliminary Report (20 Dec 2021 10:00):    No growth at 1 day.    Culture - Urine (collected 19 Dec 2021 00:46)  Source: Clean Catch Clean Catch (Midstream)  Final Report (19 Dec 2021 22:11):    No growth        IMAGING:    EKG:    #Diet - Diet, Regular (21 @ 09:57) [Active]        #DVT PPx -  #Dispo - 
    Clinical History: ABD PAIN    Family history of ulcerative colitis (Father)    Handoff    MEWS Score    No pertinent past medical history    No pertinent past medical history    Ruptured suppurative appendicitis    Ruptured suppurative appendicitis    Appendectomy, laparoscopic, with open cecum excision    Acute appendicitis    Appendectomy, laparoscopic, with open cecum excision    History of wisdom tooth extraction    ABD PAIN    Sepsis    [email or web address]        Meds:acetaminophen    Suspension .. 650 milliGRAM(s) Oral every 6 hours  famotidine    Tablet 20 milliGRAM(s) Oral daily  lactated ringers. 1000 milliLiter(s) IV Continuous <Continuous>  ondansetron Injectable 4 milliGRAM(s) IV Push every 6 hours PRN  piperacillin/tazobactam IVPB.. 3.375 Gram(s) IV Intermittent every 6 hours  potassium chloride  10 mEq/100 mL IVPB 10 milliEquivalent(s) IV Intermittent every 1 hour      Allergies:cats and dogs (Pruritus)  No Known Drug Allergies  Shrimp (Hives)        Labs:                           11.1   20.67 )-----------( 285      ( 22 Dec 2021 06:56 )             34.6       12-22    138  |  102  |  11  ----------------------------<  111<H>  3.5   |  24  |  1.06    Ca    8.6      22 Dec 2021 06:56  Phos  3.9     12-22  Mg     1.8     12-22

## 2021-12-22 NOTE — PROGRESS NOTE ADULT - ASSESSMENT
27 yo M with no significant PMH/PSH presenting with appendicitis now s/p laparoscopic appendectomy.    NPO/IVF/NGT  Pain/Nausea PRN  Ceft/Flagyl  HSQ/SCD  AM labs

## 2021-12-22 NOTE — PROGRESS NOTE ADULT - SUBJECTIVE AND OBJECTIVE BOX
INTERVAL HPI/OVERNIGHT EVENTS: CTAP with oral/IV contrast showing multiple loops of dilated bowel consistent with post-op ileus, made NPO, x1 episode of emesis, NGT placed. confiirmed with XR,     STATUS POST:   lap appy (ruptired/suppurative) and open cecum excision    POST OPERATIVE DAY #: 3    SUBJECTIVE:  pt seen at bedside, feeling well. denies nausea. passed flatus x 1 overnight, -BM    MEDICATIONS  (STANDING):  acetaminophen    Suspension .. 650 milliGRAM(s) Oral every 6 hours  famotidine    Tablet 20 milliGRAM(s) Oral daily  fluconAZOLE IVPB 400 milliGRAM(s) IV Intermittent every 24 hours  fluconAZOLE IVPB      heparin   Injectable 5000 Unit(s) SubCutaneous every 8 hours  lactated ringers. 1000 milliLiter(s) (75 mL/Hr) IV Continuous <Continuous>  piperacillin/tazobactam IVPB.. 3.375 Gram(s) IV Intermittent every 6 hours    MEDICATIONS  (PRN):  ondansetron Injectable 4 milliGRAM(s) IV Push every 6 hours PRN Nausea      Vital Signs Last 24 Hrs  T(C): 36.8 (22 Dec 2021 05:14), Max: 39.6 (21 Dec 2021 16:31)  T(F): 98.3 (22 Dec 2021 05:14), Max: 103.2 (21 Dec 2021 16:31)  HR: 63 (22 Dec 2021 05:14) (50 - 76)  BP: 111/61 (22 Dec 2021 05:14) (99/49 - 157/73)  BP(mean): --  RR: 16 (22 Dec 2021 05:14) (16 - 18)  SpO2: 95% (22 Dec 2021 05:14) (95% - 98%)    PHYSICAL EXAM:      Constitutional: A&Ox3    Respiratory: non labored breathing, no respiratory distress    Cardiovascular: NSR, RRR    Gastrointestinal: soft, mildly distended, mild diffuse tenderness, incisions c/d/i,     Extremities: (-) edema                  I&O's Detail    21 Dec 2021 07:01  -  22 Dec 2021 07:00  --------------------------------------------------------  IN:    IV PiggyBack: 100 mL    IV PiggyBack: 200 mL    IV PiggyBack: 200 mL    IV PiggyBack: 300 mL    Lactated Ringers: 1012.6 mL    Oral Fluid: 360 mL  Total IN: 2172.6 mL    OUT:    Bulb (mL): 170 mL    Nasogastric/Oral tube (mL): 400 mL    Voided (mL): 2250 mL  Total OUT: 2820 mL    Total NET: -647.4 mL          LABS:                        11.4   12.83 )-----------( 287      ( 21 Dec 2021 07:47 )             34.6     12-21    136  |  103  |  15  ----------------------------<  134<H>  3.8   |  24  |  0.98    Ca    9.0      21 Dec 2021 07:47  Phos  3.0     12-21  Mg     1.9     12-21    TPro  x   /  Alb  x   /  TBili  0.2  /  DBili  x   /  AST  x   /  ALT  x   /  AlkPhos  x   12-20    PT/INR - ( 20 Dec 2021 07:58 )   PT: 13.7 sec;   INR: 1.15                RADIOLOGY & ADDITIONAL STUDIES:

## 2021-12-22 NOTE — DIETITIAN INITIAL EVALUATION ADULT. - OTHER INFO
26M with no significant PMH/PSH presenting with appendicitis now s/p laparoscopic appendectomy 12/19. CTAP with oral/IV contrast 12/22 showing multiple loops of dilated bowel consistent with post-op ileus, made NPO, x1 episode of emesis, NGT placed. confirmed with XR.     On assessment, pt resting in bed. NPO with NGT to LIWS (400 ml/24hrs). Last BM 12/21. +abd distention and discomfort. No n/v/c. +diarrhea 12/21 per EMR. Skin surgical incision. Pain 8/10 abd- team aware. Pt reporting good appetite PTA. Follows regular diet. No cultural, ethnic, Protestant food preferences noted. UBW consistent with admission weight. Allergic to shrimp. Education provided on likely diet advancement pending ileus resolving. Please see nutr recs below. RD to follow.

## 2021-12-22 NOTE — PROGRESS NOTE ADULT - ASSESSMENT
26 year old man admitted for acute appendicitis complicated by sepsis     # Sepsis (present on admission)  At time of arrival to [facility], met at least 2/4 SIRS criteria. WBCs 22, HR >90bpm,  Source: Likely 2/2 acute appendicitis with perforation     # Acute appendicitis  s/p appendectomy  OR culture growing Streptococcus intermedius (type of streptococcus anginosus); susceptibilities pending, but per [facility] antibiogram, likely susceptible to penicillins; agree with continuing Zosyn   post-op CT shows "4.6 x 3.1 x 2.4 cm fluid collection anterior to the rectum with apparent rim enhancement suspicious for abscess collection"  WBC increasing  plan for CT-guided I&D in IR  f/u cultures  rest of care per primary team     # post-operative state  encourage ambulation   encourage incentive spirometry    # post-operative ileus  NPO  NGT in place  serial abdominal exams  rest of care per primary team     # Acute blood loss anemia   Hgb downtrended from time of admission  Hemoglobin: 11.4 g/dL (12-21-21 @ 07:47)  Hemoglobin: 14.5 g/dL (12-18-21 @ 19:02)  Partially attributable to operative blood losses and losses from drain   continue to trend daily CBC

## 2021-12-22 NOTE — CONSULT NOTE ADULT - ASSESSMENT
25 y/o M admitted with perforated appendicitis now s/p appendectomy and cecectomy c/s for drainage of 4cm pelvic abscess.  - plan for CT guided transgluteal drain placement pending negative COVID PCR  - hold HSQ    plan discussed with attending physician, Dr. Barth

## 2021-12-22 NOTE — DIETITIAN INITIAL EVALUATION ADULT. - EDUCATION DIETARY MODIFICATIONS
Dear Chu Helton,        Thank you for coming to your appointment today. I hope we have addressed all of your needs. Please make sure to do the following:  - Continue your medications as listed. - Get labs drawn before our next follow up. - Continue to monitor blood sugars at home. Take Glipizide 10 mg BID      Have a great day! Sincerely,  Jonathan Taylor M.D  2/5/2020  9:06 AM
(2) meets goals/outcomes/verbalization

## 2021-12-23 LAB
-  CEFTRIAXONE: SIGNIFICANT CHANGE UP
-  CLINDAMYCIN: SIGNIFICANT CHANGE UP
-  ERYTHROMYCIN: SIGNIFICANT CHANGE UP
-  LEVOFLOXACIN: SIGNIFICANT CHANGE UP
-  VANCOMYCIN: SIGNIFICANT CHANGE UP
ANION GAP SERPL CALC-SCNC: 12 MMOL/L — SIGNIFICANT CHANGE UP (ref 5–17)
APPEARANCE UR: CLEAR — SIGNIFICANT CHANGE UP
BACTERIA # UR AUTO: PRESENT /HPF
BILIRUB UR-MCNC: ABNORMAL
BUN SERPL-MCNC: 10 MG/DL — SIGNIFICANT CHANGE UP (ref 7–23)
CALCIUM SERPL-MCNC: 8.4 MG/DL — SIGNIFICANT CHANGE UP (ref 8.4–10.5)
CHLORIDE SERPL-SCNC: 100 MMOL/L — SIGNIFICANT CHANGE UP (ref 96–108)
CO2 SERPL-SCNC: 25 MMOL/L — SIGNIFICANT CHANGE UP (ref 22–31)
COD CRY URNS QL: ABNORMAL /HPF
COLOR SPEC: YELLOW — SIGNIFICANT CHANGE UP
CREAT SERPL-MCNC: 0.85 MG/DL — SIGNIFICANT CHANGE UP (ref 0.5–1.3)
CULTURE RESULTS: SIGNIFICANT CHANGE UP
DIFF PNL FLD: ABNORMAL
EPI CELLS # UR: SIGNIFICANT CHANGE UP /HPF (ref 0–5)
GLUCOSE SERPL-MCNC: 108 MG/DL — HIGH (ref 70–99)
GLUCOSE UR QL: NEGATIVE — SIGNIFICANT CHANGE UP
GRAM STN FLD: SIGNIFICANT CHANGE UP
HCT VFR BLD CALC: 38.6 % — LOW (ref 39–50)
HGB BLD-MCNC: 12.5 G/DL — LOW (ref 13–17)
HYALINE CASTS # UR AUTO: SIGNIFICANT CHANGE UP /LPF (ref 0–2)
KETONES UR-MCNC: 40 MG/DL
LEUKOCYTE ESTERASE UR-ACNC: NEGATIVE — SIGNIFICANT CHANGE UP
MAGNESIUM SERPL-MCNC: 2 MG/DL — SIGNIFICANT CHANGE UP (ref 1.6–2.6)
MCHC RBC-ENTMCNC: 29.6 PG — SIGNIFICANT CHANGE UP (ref 27–34)
MCHC RBC-ENTMCNC: 32.4 GM/DL — SIGNIFICANT CHANGE UP (ref 32–36)
MCV RBC AUTO: 91.5 FL — SIGNIFICANT CHANGE UP (ref 80–100)
METHOD TYPE: SIGNIFICANT CHANGE UP
METHOD TYPE: SIGNIFICANT CHANGE UP
NITRITE UR-MCNC: NEGATIVE — SIGNIFICANT CHANGE UP
NRBC # BLD: 0 /100 WBCS — SIGNIFICANT CHANGE UP (ref 0–0)
ORGANISM # SPEC MICROSCOPIC CNT: SIGNIFICANT CHANGE UP
PH UR: 6.5 — SIGNIFICANT CHANGE UP (ref 5–8)
PHOSPHATE SERPL-MCNC: 2.6 MG/DL — SIGNIFICANT CHANGE UP (ref 2.5–4.5)
PLATELET # BLD AUTO: 371 K/UL — SIGNIFICANT CHANGE UP (ref 150–400)
POTASSIUM SERPL-MCNC: 3.3 MMOL/L — LOW (ref 3.5–5.3)
POTASSIUM SERPL-SCNC: 3.3 MMOL/L — LOW (ref 3.5–5.3)
PROT UR-MCNC: 30 MG/DL
RBC # BLD: 4.22 M/UL — SIGNIFICANT CHANGE UP (ref 4.2–5.8)
RBC # FLD: 12.8 % — SIGNIFICANT CHANGE UP (ref 10.3–14.5)
RBC CASTS # UR COMP ASSIST: < 5 /HPF — SIGNIFICANT CHANGE UP
SODIUM SERPL-SCNC: 137 MMOL/L — SIGNIFICANT CHANGE UP (ref 135–145)
SP GR SPEC: 1.02 — SIGNIFICANT CHANGE UP (ref 1–1.03)
SPECIMEN SOURCE: SIGNIFICANT CHANGE UP
SPECIMEN SOURCE: SIGNIFICANT CHANGE UP
UROBILINOGEN FLD QL: 0.2 E.U./DL — SIGNIFICANT CHANGE UP
WBC # BLD: 19.47 K/UL — HIGH (ref 3.8–10.5)
WBC # FLD AUTO: 19.47 K/UL — HIGH (ref 3.8–10.5)
WBC UR QL: < 5 /HPF — SIGNIFICANT CHANGE UP

## 2021-12-23 PROCEDURE — 99233 SBSQ HOSP IP/OBS HIGH 50: CPT

## 2021-12-23 RX ORDER — HYDROMORPHONE HYDROCHLORIDE 2 MG/ML
0.25 INJECTION INTRAMUSCULAR; INTRAVENOUS; SUBCUTANEOUS EVERY 4 HOURS
Refills: 0 | Status: DISCONTINUED | OUTPATIENT
Start: 2021-12-23 | End: 2021-12-26

## 2021-12-23 RX ORDER — SODIUM CHLORIDE 9 MG/ML
1000 INJECTION, SOLUTION INTRAVENOUS
Refills: 0 | Status: DISCONTINUED | OUTPATIENT
Start: 2021-12-23 | End: 2021-12-23

## 2021-12-23 RX ORDER — ACETAMINOPHEN 500 MG
1000 TABLET ORAL ONCE
Refills: 0 | Status: DISCONTINUED | OUTPATIENT
Start: 2021-12-23 | End: 2021-12-26

## 2021-12-23 RX ORDER — POTASSIUM CHLORIDE 20 MEQ
20 PACKET (EA) ORAL
Refills: 0 | Status: COMPLETED | OUTPATIENT
Start: 2021-12-23 | End: 2021-12-23

## 2021-12-23 RX ORDER — FAMOTIDINE 10 MG/ML
20 INJECTION INTRAVENOUS DAILY
Refills: 0 | Status: DISCONTINUED | OUTPATIENT
Start: 2021-12-23 | End: 2021-12-24

## 2021-12-23 RX ORDER — ONDANSETRON 8 MG/1
4 TABLET, FILM COATED ORAL EVERY 6 HOURS
Refills: 0 | Status: DISCONTINUED | OUTPATIENT
Start: 2021-12-23 | End: 2021-12-26

## 2021-12-23 RX ORDER — HEPARIN SODIUM 5000 [USP'U]/ML
5000 INJECTION INTRAVENOUS; SUBCUTANEOUS EVERY 8 HOURS
Refills: 0 | Status: DISCONTINUED | OUTPATIENT
Start: 2021-12-23 | End: 2021-12-26

## 2021-12-23 RX ORDER — HEPARIN SODIUM 5000 [USP'U]/ML
5000 INJECTION INTRAVENOUS; SUBCUTANEOUS EVERY 8 HOURS
Refills: 0 | Status: DISCONTINUED | OUTPATIENT
Start: 2021-12-23 | End: 2021-12-23

## 2021-12-23 RX ORDER — PIPERACILLIN AND TAZOBACTAM 4; .5 G/20ML; G/20ML
3.38 INJECTION, POWDER, LYOPHILIZED, FOR SOLUTION INTRAVENOUS EVERY 6 HOURS
Refills: 0 | Status: DISCONTINUED | OUTPATIENT
Start: 2021-12-23 | End: 2021-12-26

## 2021-12-23 RX ORDER — POTASSIUM PHOSPHATE, MONOBASIC POTASSIUM PHOSPHATE, DIBASIC 236; 224 MG/ML; MG/ML
15 INJECTION, SOLUTION INTRAVENOUS ONCE
Refills: 0 | Status: COMPLETED | OUTPATIENT
Start: 2021-12-23 | End: 2021-12-23

## 2021-12-23 RX ORDER — SODIUM CHLORIDE 9 MG/ML
1000 INJECTION, SOLUTION INTRAVENOUS
Refills: 0 | Status: DISCONTINUED | OUTPATIENT
Start: 2021-12-23 | End: 2021-12-25

## 2021-12-23 RX ADMIN — PIPERACILLIN AND TAZOBACTAM 200 GRAM(S): 4; .5 INJECTION, POWDER, LYOPHILIZED, FOR SOLUTION INTRAVENOUS at 00:04

## 2021-12-23 RX ADMIN — PIPERACILLIN AND TAZOBACTAM 200 GRAM(S): 4; .5 INJECTION, POWDER, LYOPHILIZED, FOR SOLUTION INTRAVENOUS at 23:00

## 2021-12-23 RX ADMIN — PIPERACILLIN AND TAZOBACTAM 200 GRAM(S): 4; .5 INJECTION, POWDER, LYOPHILIZED, FOR SOLUTION INTRAVENOUS at 06:00

## 2021-12-23 RX ADMIN — PIPERACILLIN AND TAZOBACTAM 200 GRAM(S): 4; .5 INJECTION, POWDER, LYOPHILIZED, FOR SOLUTION INTRAVENOUS at 18:00

## 2021-12-23 RX ADMIN — POTASSIUM PHOSPHATE, MONOBASIC POTASSIUM PHOSPHATE, DIBASIC 62.5 MILLIMOLE(S): 236; 224 INJECTION, SOLUTION INTRAVENOUS at 21:37

## 2021-12-23 RX ADMIN — HEPARIN SODIUM 5000 UNIT(S): 5000 INJECTION INTRAVENOUS; SUBCUTANEOUS at 21:38

## 2021-12-23 RX ADMIN — Medication 50 MILLIEQUIVALENT(S): at 19:29

## 2021-12-23 RX ADMIN — Medication 50 MILLIEQUIVALENT(S): at 09:15

## 2021-12-23 RX ADMIN — PIPERACILLIN AND TAZOBACTAM 200 GRAM(S): 4; .5 INJECTION, POWDER, LYOPHILIZED, FOR SOLUTION INTRAVENOUS at 11:35

## 2021-12-23 RX ADMIN — SODIUM CHLORIDE 120 MILLILITER(S): 9 INJECTION, SOLUTION INTRAVENOUS at 06:44

## 2021-12-23 RX ADMIN — Medication 50 MILLIEQUIVALENT(S): at 16:03

## 2021-12-23 NOTE — BRIEF OPERATIVE NOTE - NSICDXBRIEFPROCEDURE_GEN_ALL_CORE_FT
PROCEDURES:  Appendectomy, laparoscopic, with open cecum excision 19-Dec-2021 11:51:32  Gus Mcneal  
PROCEDURES:  Diagnostic laparoscopy 23-Dec-2021 14:20:58  Carlos Alberto Novoa  Laparoscopic washout of abdominal cavity 23-Dec-2021 14:21:25  Carlos Alberto Novoa

## 2021-12-23 NOTE — PROGRESS NOTE ADULT - ASSESSMENT
26 year old man admitted for acute appendicitis complicated by sepsis     # Sepsis (present on admission)  At time of arrival to [facility], met at least 2/4 SIRS criteria. WBCs 22, HR >90bpm,  Source: Likely 2/2 acute appendicitis with perforation     # Acute appendicitis  s/p appendectomy (laparoscopic converted to open)  OR culture growing Streptococcus intermedius (type of streptococcus anginosus); suggest changing abx to CTX + Flagyl   post-op CT shows "4.6 x 3.1 x 2.4 cm fluid collection anterior to the rectum with apparent rim enhancement suspicious for abscess collection"  WBC increasing  CT-guided I&D in IR 12/22 unsuccessful; plan to go to OR today  f/u cultures  rest of care per primary team     # post-operative state  encourage ambulation   encourage incentive spirometry    # post-operative ileus  NPO  NGT in place  serial abdominal exams  rest of care per primary team     # Acute blood loss anemia   Hgb downtrended from time of admission  Hemoglobin: 11.4 g/dL (12-21-21 @ 07:47)  Hemoglobin: 14.5 g/dL (12-18-21 @ 19:02)  Partially attributable to operative blood losses and losses from drain   continue to trend daily CBC

## 2021-12-23 NOTE — BRIEF OPERATIVE NOTE - NSICDXBRIEFPOSTOP_GEN_ALL_CORE_FT
POST-OP DIAGNOSIS:  Sepsis 23-Dec-2021 14:22:12  Carlos Alberto Novoa  
POST-OP DIAGNOSIS:  Ruptured suppurative appendicitis 19-Dec-2021 11:51:57  Gus Mcneal

## 2021-12-23 NOTE — PROGRESS NOTE ADULT - SUBJECTIVE AND OBJECTIVE BOX
Patient is a 26y old  Male who presents with a chief complaint of perforated appendicitis (22 Dec 2021 12:59)      INTERVAL HPI/OVERNIGHT EVENTS:    Pt. seen and examined earlier today  Pt. reports multiple loose BMs O/N, +flatus  Abdominal discomfort and distension about the same  Febrile x1 O/N, no chills    Review of Systems: 12 point review of systems otherwise negative    MEDICATIONS  (STANDING):  potassium chloride  20 mEq/100 mL IVPB 20 milliEquivalent(s) IV Intermittent every 2 hours  potassium phosphate IVPB 15 milliMole(s) IV Intermittent once    MEDICATIONS  (PRN):      Allergies    cats and dogs (Pruritus)  No Known Drug Allergies  Shrimp (Hives)    Intolerances          Vital Signs Last 24 Hrs  T(C): 37.6 (23 Dec 2021 11:24), Max: 38.9 (22 Dec 2021 20:10)  T(F): 99.6 (23 Dec 2021 11:24), Max: 102.1 (22 Dec 2021 20:10)  HR: 77 (23 Dec 2021 11:24) (67 - 77)  BP: 146/77 (23 Dec 2021 11:24) (126/63 - 146/77)  BP(mean): --  RR: 17 (23 Dec 2021 11:24) (17 - 19)  SpO2: 95% (23 Dec 2021 11:24) (93% - 96%)  CAPILLARY BLOOD GLUCOSE           @ 07:  -   @ 07:00  --------------------------------------------------------  IN: 2680 mL / OUT: 3350 mL / NET: -670 mL     @ 07:01  -   @ 13:17  --------------------------------------------------------  IN: 740 mL / OUT: 625 mL / NET: 115 mL        Physical Exam:  (earlier today)   Daily Height in cm: 182.9 (23 Dec 2021 11:24)    Daily   General:  non-toxic appearing in NAD  HEENT:  +NGT  CV:  RRR  Lungs:  CTA B/L  Abdomen:  softly distended, minimally TTP  Extremities:  no edema B/L LE  Skin:  WWP  Neuro:  AAOx3    LABS:                        12.5   19.47 )-----------( 371      ( 23 Dec 2021 06:38 )             38.6         137  |  100  |  10  ----------------------------<  108<H>  3.3<L>   |  25  |  0.85    Ca    8.4      23 Dec 2021 06:38  Phos  2.6       Mg     2.0             Urinalysis Basic - ( 23 Dec 2021 03:48 )    Color: Yellow / Appearance: Clear / S.025 / pH: x  Gluc: x / Ketone: 40 mg/dL  / Bili: Small / Urobili: 0.2 E.U./dL   Blood: x / Protein: 30 mg/dL / Nitrite: NEGATIVE   Leuk Esterase: NEGATIVE / RBC: < 5 /HPF / WBC < 5 /HPF   Sq Epi: x / Non Sq Epi: 0-5 /HPF / Bacteria: Present /HPF

## 2021-12-23 NOTE — BRIEF OPERATIVE NOTE - OPERATION/FINDINGS
Abdomen re-entered via umbilical incision, two additional 5 ports placed. Significant inflammation between omentum and anterior abdominal wall. Murky fluid encountered in pelvis and over the dome of the liver. Careful blunt dissection performed to inferior pelvis and RLQ using suction. No large abscess/fluid collection identified. Abdomen irrigated thoroughly. 15F FANI left in pelvis. Fascia closed with interrupted 0-vicryl. Skin closed with 4-0 monocryl.

## 2021-12-23 NOTE — PROGRESS NOTE ADULT - SUBJECTIVE AND OBJECTIVE BOX
General Surgery Post-Op Note    Procedure: Diagnostic Laparoscopy and Abdominal Washout     Diagnosis/Indication: Acute appendicitis     Surgeon:     S: Pt has no complaints. NGT in place.  Denies CP, SOB, MELENDEZ, calf tenderness. Pain controlled with medication.    O:  T(C): 36.6 (12-23-21 @ 16:00), Max: 36.6 (12-23-21 @ 16:00)  T(F): 97.9 (12-23-21 @ 16:00), Max: 97.9 (12-23-21 @ 16:00)  HR: 65 (12-23-21 @ 16:00) (54 - 81)  BP: 142/71 (12-23-21 @ 16:00) (127/63 - 142/71)  RR: 17 (12-23-21 @ 16:00) (16 - 22)  SpO2: 95% (12-23-21 @ 16:00) (94% - 100%)  Wt(kg): --                        12.5   19.47 )-----------( 371      ( 23 Dec 2021 06:38 )             38.6     12-23    137  |  100  |  10  ----------------------------<  108<H>  3.3<L>   |  25  |  0.85    Ca    8.4      23 Dec 2021 06:38  Phos  2.6     12-23  Mg     2.0     12-23        Gen: NAD, resting comfortably in bed  C/V: NSR  Pulm: Nonlabored breathing, no respiratory distress  Abd: soft, some mild incisional tenderness, non-distended. incisions clean dry and intact. 1x FANI with 50 cc of serosanguinous output  Extrem: WWP, no calf edema, SCDs in place      A/P: 26yMale s/p above procedure  Diet: NPO  Pain/nausea control  DVT ppx: SQH  Remove NGT tonight

## 2021-12-23 NOTE — BRIEF OPERATIVE NOTE - NSICDXBRIEFPREOP_GEN_ALL_CORE_FT
PRE-OP DIAGNOSIS:  Ruptured suppurative appendicitis 19-Dec-2021 11:51:45  Gus Mcneal  
PRE-OP DIAGNOSIS:  Sepsis 23-Dec-2021 14:21:42  Carlos Alberto Novoa  Right lower quadrant abdominal abscess 23-Dec-2021 14:21:56  Carlos Alberto Novoa

## 2021-12-24 LAB
ANION GAP SERPL CALC-SCNC: 11 MMOL/L — SIGNIFICANT CHANGE UP (ref 5–17)
BUN SERPL-MCNC: 12 MG/DL — SIGNIFICANT CHANGE UP (ref 7–23)
CALCIUM SERPL-MCNC: 8.1 MG/DL — LOW (ref 8.4–10.5)
CHLORIDE SERPL-SCNC: 100 MMOL/L — SIGNIFICANT CHANGE UP (ref 96–108)
CO2 SERPL-SCNC: 27 MMOL/L — SIGNIFICANT CHANGE UP (ref 22–31)
CREAT SERPL-MCNC: 0.85 MG/DL — SIGNIFICANT CHANGE UP (ref 0.5–1.3)
CULTURE RESULTS: SIGNIFICANT CHANGE UP
CULTURE RESULTS: SIGNIFICANT CHANGE UP
GLUCOSE SERPL-MCNC: 126 MG/DL — HIGH (ref 70–99)
HCT VFR BLD CALC: 36.2 % — LOW (ref 39–50)
HGB BLD-MCNC: 11.8 G/DL — LOW (ref 13–17)
MAGNESIUM SERPL-MCNC: 2 MG/DL — SIGNIFICANT CHANGE UP (ref 1.6–2.6)
MCHC RBC-ENTMCNC: 29.4 PG — SIGNIFICANT CHANGE UP (ref 27–34)
MCHC RBC-ENTMCNC: 32.6 GM/DL — SIGNIFICANT CHANGE UP (ref 32–36)
MCV RBC AUTO: 90 FL — SIGNIFICANT CHANGE UP (ref 80–100)
NRBC # BLD: 0 /100 WBCS — SIGNIFICANT CHANGE UP (ref 0–0)
PHOSPHATE SERPL-MCNC: 3.2 MG/DL — SIGNIFICANT CHANGE UP (ref 2.5–4.5)
PLATELET # BLD AUTO: 400 K/UL — SIGNIFICANT CHANGE UP (ref 150–400)
POTASSIUM SERPL-MCNC: 4.1 MMOL/L — SIGNIFICANT CHANGE UP (ref 3.5–5.3)
POTASSIUM SERPL-SCNC: 4.1 MMOL/L — SIGNIFICANT CHANGE UP (ref 3.5–5.3)
RBC # BLD: 4.02 M/UL — LOW (ref 4.2–5.8)
RBC # FLD: 12.5 % — SIGNIFICANT CHANGE UP (ref 10.3–14.5)
SODIUM SERPL-SCNC: 138 MMOL/L — SIGNIFICANT CHANGE UP (ref 135–145)
SPECIMEN SOURCE: SIGNIFICANT CHANGE UP
SPECIMEN SOURCE: SIGNIFICANT CHANGE UP
WBC # BLD: 13.56 K/UL — HIGH (ref 3.8–10.5)
WBC # FLD AUTO: 13.56 K/UL — HIGH (ref 3.8–10.5)

## 2021-12-24 PROCEDURE — 99233 SBSQ HOSP IP/OBS HIGH 50: CPT

## 2021-12-24 RX ORDER — ACETAMINOPHEN 500 MG
650 TABLET ORAL EVERY 6 HOURS
Refills: 0 | Status: DISCONTINUED | OUTPATIENT
Start: 2021-12-24 | End: 2021-12-26

## 2021-12-24 RX ADMIN — HEPARIN SODIUM 5000 UNIT(S): 5000 INJECTION INTRAVENOUS; SUBCUTANEOUS at 05:44

## 2021-12-24 RX ADMIN — PIPERACILLIN AND TAZOBACTAM 200 GRAM(S): 4; .5 INJECTION, POWDER, LYOPHILIZED, FOR SOLUTION INTRAVENOUS at 18:00

## 2021-12-24 RX ADMIN — FAMOTIDINE 20 MILLIGRAM(S): 10 INJECTION INTRAVENOUS at 12:00

## 2021-12-24 RX ADMIN — PIPERACILLIN AND TAZOBACTAM 200 GRAM(S): 4; .5 INJECTION, POWDER, LYOPHILIZED, FOR SOLUTION INTRAVENOUS at 05:45

## 2021-12-24 RX ADMIN — PIPERACILLIN AND TAZOBACTAM 200 GRAM(S): 4; .5 INJECTION, POWDER, LYOPHILIZED, FOR SOLUTION INTRAVENOUS at 12:00

## 2021-12-24 RX ADMIN — Medication 650 MILLIGRAM(S): at 16:23

## 2021-12-24 RX ADMIN — Medication 650 MILLIGRAM(S): at 17:23

## 2021-12-24 RX ADMIN — SODIUM CHLORIDE 110 MILLILITER(S): 9 INJECTION, SOLUTION INTRAVENOUS at 14:19

## 2021-12-24 RX ADMIN — HEPARIN SODIUM 5000 UNIT(S): 5000 INJECTION INTRAVENOUS; SUBCUTANEOUS at 14:00

## 2021-12-24 RX ADMIN — HEPARIN SODIUM 5000 UNIT(S): 5000 INJECTION INTRAVENOUS; SUBCUTANEOUS at 21:43

## 2021-12-24 NOTE — PROGRESS NOTE ADULT - ASSESSMENT
27 yo M with no significant PMH/PSH presenting with appendicitis now s/p laparoscopic converted to open appendectomy 12/19, lap abd washout 12/23    FLD/IVF  Pain/Nausea PRN  Zosyn  HSQ/SCD  AM labs

## 2021-12-24 NOTE — PROGRESS NOTE ADULT - ASSESSMENT
26 year old man admitted for acute appendicitis complicated by sepsis     # Sepsis (present on admission)  At time of arrival to [facility], met at least 2/4 SIRS criteria. WBCs 22, HR >90bpm,  Source: Likely 2/2 acute appendicitis with perforation     # Acute appendicitis  s/p appendectomy (laparoscopic converted to open). Post-op CT with "4.6 x 3.1 x 2.4 cm fluid collection anterior to the rectum with apparent rim enhancement suspicious for abscess collection" - taken back to OR 12/23 for abdominal cavity washout, no abscess found.  -initial OR culture growing Streptococcus intermedius (type of streptococcus anginosus)  -on zosyn for now - f/u new OR cultures  -rest of care per primary team     # post-operative state  encourage ambulation   encourage incentive spirometry    # post-operative ileus  tolerating FLD. management per primary team     # Acute blood loss anemia   Hgb downtrended from time of admission, likely 2/2 operative blood loss and drains  continue to trend daily CBC     Dispo: pending surgical recovery, cultures/abx plan

## 2021-12-24 NOTE — PROGRESS NOTE ADULT - SUBJECTIVE AND OBJECTIVE BOX
STATUS POST:  Appendectomy, laparoscopic, with open cecum excision    Appendectomy, laparoscopic, with open cecum excision    Diagnostic laparoscopy    Laparoscopic washout of abdominal cavity      POST OPERATIVE DAY #: 5 (Lap appy), 1 (Abdominal washout)    SUBJECTIVE:   No acute events overnight. Passed TOV. Says pain has improved. Denies nausea, vomiting. Passing gas. Had small episode of diarrhea. Has been ambulating 2 blocks.    ---------------------------------------------------------------    Vital Signs Last 24 Hrs  T(C): 37.1 (24 Dec 2021 12:48), Max: 37.1 (24 Dec 2021 12:48)  T(F): 98.8 (24 Dec 2021 12:48), Max: 98.8 (24 Dec 2021 12:48)  HR: 63 (24 Dec 2021 12:48) (60 - 72)  BP: 120/68 (24 Dec 2021 12:48) (120/68 - 142/71)  BP(mean): 95 (23 Dec 2021 16:00) (95 - 95)  RR: 17 (24 Dec 2021 12:48) (14 - 17)  SpO2: 98% (24 Dec 2021 12:48) (95% - 98%)    I&O's Detail    23 Dec 2021 07:01  -  24 Dec 2021 07:00  --------------------------------------------------------  IN:    dextrose 5% + sodium chloride 0.9%: 540 mL    IV PiggyBack: 200 mL    IV PiggyBack: 225 mL    Lactated Ringers: 1595 mL  Total IN: 2560 mL    OUT:    Bulb (mL): 110 mL    Nasogastric/Oral tube (mL): 200 mL    Voided (mL): 2325 mL  Total OUT: 2635 mL    Total NET: -75 mL      24 Dec 2021 07:01  -  24 Dec 2021 15:34  --------------------------------------------------------  IN:    IV PiggyBack: 100 mL    Lactated Ringers: 770 mL    Oral Fluid: 750 mL  Total IN: 1620 mL    OUT:    Bulb (mL): 60 mL    Voided (mL): 600 mL  Total OUT: 660 mL    Total NET: 960 mL          ----------------------------------------------------------------    Physical Exam:  General: NAD, resting comfortably in bed  Pulmonary: Nonlabored breathing, no respiratory distress  CV: NSR  Abd: soft, appropriately tender to palpation, no guarding, incisions c/d/i, FANI - serosang  Extremities: (-) edema, warm, well-perfused    -------------------------------------------------------------------    LABS:                        11.8   13.56 )-----------( 400      ( 24 Dec 2021 06:44 )             36.2     12-24    138  |  100  |  12  ----------------------------<  126<H>  4.1   |  27  |  0.85    Ca    8.1<L>      24 Dec 2021 06:44  Phos  3.2     12-  Mg     2.0     12-24        Urinalysis Basic - ( 23 Dec 2021 03:48 )    Color: Yellow / Appearance: Clear / S.025 / pH: x  Gluc: x / Ketone: 40 mg/dL  / Bili: Small / Urobili: 0.2 E.U./dL   Blood: x / Protein: 30 mg/dL / Nitrite: NEGATIVE   Leuk Esterase: NEGATIVE / RBC: < 5 /HPF / WBC < 5 /HPF   Sq Epi: x / Non Sq Epi: 0-5 /HPF / Bacteria: Present /HPF          RADIOLOGY & ADDITIONAL STUDIES:

## 2021-12-24 NOTE — PROGRESS NOTE ADULT - SUBJECTIVE AND OBJECTIVE BOX
Subjective/Interval events:  s/p OR yesterday, no large abscess noted, had washout of abdominal cavity done. No acute overnight events. This morning reports some abdominal soreness he attributes to surgery. No fever/chills, dizziness, lightheadedness, other issues.    MEDICATIONS  (STANDING):  acetaminophen   IVPB .. 1000 milliGRAM(s) IV Intermittent once  famotidine    Tablet 20 milliGRAM(s) Oral daily  heparin   Injectable 5000 Unit(s) SubCutaneous every 8 hours  lactated ringers. 1000 milliLiter(s) (110 mL/Hr) IV Continuous <Continuous>  piperacillin/tazobactam IVPB.. 3.375 Gram(s) IV Intermittent every 6 hours    MEDICATIONS  (PRN):  acetaminophen     Tablet .. 650 milliGRAM(s) Oral every 6 hours PRN Mild Pain (1 - 3)  HYDROmorphone  Injectable 0.25 milliGRAM(s) IV Push every 4 hours PRN Severe Pain (7 - 10)  ondansetron Injectable 4 milliGRAM(s) IV Push every 6 hours PRN Nausea      Vital Signs Last 24 Hrs  T(C): 37.1 (24 Dec 2021 12:48), Max: 37.1 (24 Dec 2021 12:48)  T(F): 98.8 (24 Dec 2021 12:48), Max: 98.8 (24 Dec 2021 12:48)  HR: 63 (24 Dec 2021 12:48) (60 - 72)  BP: 120/68 (24 Dec 2021 12:48) (120/68 - 135/72)  BP(mean): --  RR: 17 (24 Dec 2021 12:48) (14 - 17)  SpO2: 98% (24 Dec 2021 12:48) (96% - 98%)    PHYSICAL EXAM:  GENERAL: pleasant, appropriate, no acute distress. Participating appropriately in interview  HEENT - NC/AT, EOMI, PERLLA, oropharynx clear without exudate or erythema, moist mucus membranes  NECK: Soft, supple, No JVD, no lymphadenopathy  CHEST/LUNG: Clear to auscultation bilaterally; No rales, rhonchi, wheezing. Normal work of breathing, not tachypneic  HEART: Regular rate and rhythm; No murmurs, rubs, or gallops, normal s1/s2. Warm and well-perfused  ABDOMEN: Soft, mild tenderness to palpation, nondistended. Normoactive bowel sounds.  EXTREMITIES:  2+ Peripheral Pulses, No clubbing, cyanosis, or edema  NEURO:  awake, alert, oriented to person, place, time, and situation. Cranial nerves intact, no motor or sensory deficits. Moves all extremities.  SKIN: abdominal surgical sites c/d/i    LABS:  12-24    138  |  100  |  12  ----------------------------<  126  4.1   |  27  |  0.85    Ca    8.1      24 Dec 2021 06:44  Phos  3.2     12-24  Mg     2.0     12-24                            11.8   13.56 )-----------( 400      ( 24 Dec 2021 06:44 )             36.2       Culture - Body Fluid with Gram Stain (collected 23 Dec 2021 15:28)  Source: .Body Fluid Intraabdominal Abscess  Gram Stain (23 Dec 2021 18:05):    No organisms seen    Few-moderate White blood cells    Culture - Blood (collected 23 Dec 2021 00:59)  Source: .Blood Blood  Preliminary Report (24 Dec 2021 01:00):    No growth at 1 day.    Culture - Blood (collected 23 Dec 2021 00:59)  Source: .Blood Blood  Preliminary Report (24 Dec 2021 01:00):    No growth at 1 day.    Culture - Abscess with Gram Stain (collected 22 Dec 2021 21:14)  Source: .Abscess pelvic abcess collection  Gram Stain (22 Dec 2021 22:32):    No organisms seen    No WBC's seen.  Preliminary Report (23 Dec 2021 08:26):    No growth to date    Culture - Blood (collected 22 Dec 2021 12:17)  Source: .Blood Blood  Preliminary Report (24 Dec 2021 13:01):    No growth at 2 days.    Culture - Blood (collected 22 Dec 2021 12:17)  Source: .Blood Blood  Preliminary Report (24 Dec 2021 13:01):    No growth at 2 days.    Culture - Abscess with Gram Stain (collected 19 Dec 2021 11:41)  Source: .Abscess OR-PELVIC ABSCESS CULTURE  Gram Stain (20 Dec 2021 07:11):    Numerous white blood cells    Few Gram positive cocci in pairs  Final Report (23 Dec 2021 09:14):    Few Streptococcus intermedius  Organism: Streptococcus intermedius  Streptococcus intermedius (23 Dec 2021 09:14)  Organism: Streptococcus intermedius (23 Dec 2021 09:14)      -  Ceftriaxone: S 0.125      -  Levofloxacin: S 0.38      Method Type: ETEST  Organism: Streptococcus intermedius (23 Dec 2021 09:14)      -  Clindamycin: S      -  Erythromycin: S      -  Vancomycin: S      Method Type: KB    Culture - Blood (collected 19 Dec 2021 09:39)  Source: .Blood Blood  Final Report (24 Dec 2021 10:00):    No growth at 5 days.    Culture - Blood (collected 19 Dec 2021 09:39)  Source: .Blood Blood  Final Report (24 Dec 2021 10:00):    No growth at 5 days.    Culture - Urine (collected 19 Dec 2021 00:46)  Source: Clean Catch Clean Catch (Midstream)  Final Report (19 Dec 2021 22:11):    No growth      COVID-19 PCR: NotDetec (12-22-21 @ 11:47)    RADIOLOGY & ADDITIONAL TESTS:  reviewed, none new

## 2021-12-25 LAB
ANION GAP SERPL CALC-SCNC: 11 MMOL/L — SIGNIFICANT CHANGE UP (ref 5–17)
BUN SERPL-MCNC: 9 MG/DL — SIGNIFICANT CHANGE UP (ref 7–23)
CALCIUM SERPL-MCNC: 8.6 MG/DL — SIGNIFICANT CHANGE UP (ref 8.4–10.5)
CHLORIDE SERPL-SCNC: 99 MMOL/L — SIGNIFICANT CHANGE UP (ref 96–108)
CO2 SERPL-SCNC: 26 MMOL/L — SIGNIFICANT CHANGE UP (ref 22–31)
CREAT SERPL-MCNC: 0.79 MG/DL — SIGNIFICANT CHANGE UP (ref 0.5–1.3)
GLUCOSE SERPL-MCNC: 100 MG/DL — HIGH (ref 70–99)
HCT VFR BLD CALC: 35.7 % — LOW (ref 39–50)
HGB BLD-MCNC: 11.9 G/DL — LOW (ref 13–17)
MAGNESIUM SERPL-MCNC: 1.9 MG/DL — SIGNIFICANT CHANGE UP (ref 1.6–2.6)
MCHC RBC-ENTMCNC: 30.3 PG — SIGNIFICANT CHANGE UP (ref 27–34)
MCHC RBC-ENTMCNC: 33.3 GM/DL — SIGNIFICANT CHANGE UP (ref 32–36)
MCV RBC AUTO: 90.8 FL — SIGNIFICANT CHANGE UP (ref 80–100)
NRBC # BLD: 0 /100 WBCS — SIGNIFICANT CHANGE UP (ref 0–0)
PHOSPHATE SERPL-MCNC: 3.2 MG/DL — SIGNIFICANT CHANGE UP (ref 2.5–4.5)
PLATELET # BLD AUTO: 485 K/UL — HIGH (ref 150–400)
POTASSIUM SERPL-MCNC: 3.5 MMOL/L — SIGNIFICANT CHANGE UP (ref 3.5–5.3)
POTASSIUM SERPL-SCNC: 3.5 MMOL/L — SIGNIFICANT CHANGE UP (ref 3.5–5.3)
RBC # BLD: 3.93 M/UL — LOW (ref 4.2–5.8)
RBC # FLD: 12.5 % — SIGNIFICANT CHANGE UP (ref 10.3–14.5)
SODIUM SERPL-SCNC: 136 MMOL/L — SIGNIFICANT CHANGE UP (ref 135–145)
WBC # BLD: 14.13 K/UL — HIGH (ref 3.8–10.5)
WBC # FLD AUTO: 14.13 K/UL — HIGH (ref 3.8–10.5)

## 2021-12-25 RX ORDER — POTASSIUM CHLORIDE 20 MEQ
40 PACKET (EA) ORAL ONCE
Refills: 0 | Status: COMPLETED | OUTPATIENT
Start: 2021-12-25 | End: 2021-12-25

## 2021-12-25 RX ORDER — MAGNESIUM SULFATE 500 MG/ML
1 VIAL (ML) INJECTION ONCE
Refills: 0 | Status: COMPLETED | OUTPATIENT
Start: 2021-12-25 | End: 2021-12-25

## 2021-12-25 RX ADMIN — Medication 100 GRAM(S): at 12:43

## 2021-12-25 RX ADMIN — PIPERACILLIN AND TAZOBACTAM 200 GRAM(S): 4; .5 INJECTION, POWDER, LYOPHILIZED, FOR SOLUTION INTRAVENOUS at 18:07

## 2021-12-25 RX ADMIN — HEPARIN SODIUM 5000 UNIT(S): 5000 INJECTION INTRAVENOUS; SUBCUTANEOUS at 12:43

## 2021-12-25 RX ADMIN — PIPERACILLIN AND TAZOBACTAM 200 GRAM(S): 4; .5 INJECTION, POWDER, LYOPHILIZED, FOR SOLUTION INTRAVENOUS at 05:22

## 2021-12-25 RX ADMIN — HEPARIN SODIUM 5000 UNIT(S): 5000 INJECTION INTRAVENOUS; SUBCUTANEOUS at 22:13

## 2021-12-25 RX ADMIN — PIPERACILLIN AND TAZOBACTAM 200 GRAM(S): 4; .5 INJECTION, POWDER, LYOPHILIZED, FOR SOLUTION INTRAVENOUS at 12:43

## 2021-12-25 RX ADMIN — Medication 40 MILLIEQUIVALENT(S): at 12:42

## 2021-12-25 RX ADMIN — HEPARIN SODIUM 5000 UNIT(S): 5000 INJECTION INTRAVENOUS; SUBCUTANEOUS at 05:22

## 2021-12-25 RX ADMIN — PIPERACILLIN AND TAZOBACTAM 200 GRAM(S): 4; .5 INJECTION, POWDER, LYOPHILIZED, FOR SOLUTION INTRAVENOUS at 22:13

## 2021-12-25 RX ADMIN — PIPERACILLIN AND TAZOBACTAM 200 GRAM(S): 4; .5 INJECTION, POWDER, LYOPHILIZED, FOR SOLUTION INTRAVENOUS at 00:12

## 2021-12-25 NOTE — PROGRESS NOTE ADULT - SUBJECTIVE AND OBJECTIVE BOX
STATUS POST:     12/19: lap appy (ruptired/suppurative) and open cecum excision  12/23: lap abdominal washout       SUBJECTIVE: Patient seen and examined bedside by chief resident. Patient complains of gas pain that he states is making it hard to breathe sometimes. He is OOBA and tolerating FLD.     heparin   Injectable 5000 Unit(s) SubCutaneous every 8 hours  piperacillin/tazobactam IVPB.. 3.375 Gram(s) IV Intermittent every 6 hours      Vital Signs Last 24 Hrs  T(C): 36.7 (25 Dec 2021 05:01), Max: 37.1 (24 Dec 2021 12:48)  T(F): 98.1 (25 Dec 2021 05:01), Max: 98.8 (24 Dec 2021 12:48)  HR: 60 (25 Dec 2021 05:01) (60 - 63)  BP: 132/68 (25 Dec 2021 05:01) (116/65 - 132/68)  BP(mean): --  RR: 17 (25 Dec 2021 05:01) (14 - 17)  SpO2: 96% (25 Dec 2021 05:01) (96% - 98%)  I&O's Detail    24 Dec 2021 07:01  -  25 Dec 2021 07:00  --------------------------------------------------------  IN:    IV PiggyBack: 100 mL    Lactated Ringers: 1210 mL    Oral Fluid: 1300 mL  Total IN: 2610 mL    OUT:    Bulb (mL): 110 mL    Voided (mL): 1650 mL  Total OUT: 1760 mL    Total NET: 850 mL          Physical Exam:  General: NAD, resting comfortably in bed  Pulmonary: Nonlabored breathing, no respiratory distress  CV: NSR  Abd: soft, appropriately tender to palpation, no guarding, incisions c/d/i, FANI - serous  Extremities: (-) edema, warm, well-perfused    LABS:                        11.9   14.13 )-----------( 485      ( 25 Dec 2021 07:10 )             35.7     12-25    136  |  99  |  9   ----------------------------<  100<H>  3.5   |  26  |  0.79    Ca    8.6      25 Dec 2021 07:10  Phos  3.2     12-25  Mg     1.9     12-25            RADIOLOGY & ADDITIONAL STUDIES:    25 yo M with no significant PMH/PSH presenting with appendicitis now s/p laparoscopic converted to open appendectomy 12/19, lap abd washout 12/23    FLD/IVF  Pain/Nausea PRN  ZosynJP  HSQ/SCD  AM labs       STATUS POST:     12/19: lap appy (ruptired/suppurative) and open cecum excision  12/23: lap abdominal washout       SUBJECTIVE: Patient seen and examined bedside by chief resident. Patient complains of gas pain that he states is making it hard to breathe sometimes. He is OOBA and tolerating FLD.     heparin   Injectable 5000 Unit(s) SubCutaneous every 8 hours  piperacillin/tazobactam IVPB.. 3.375 Gram(s) IV Intermittent every 6 hours      Vital Signs Last 24 Hrs  T(C): 36.7 (25 Dec 2021 05:01), Max: 37.1 (24 Dec 2021 12:48)  T(F): 98.1 (25 Dec 2021 05:01), Max: 98.8 (24 Dec 2021 12:48)  HR: 60 (25 Dec 2021 05:01) (60 - 63)  BP: 132/68 (25 Dec 2021 05:01) (116/65 - 132/68)  BP(mean): --  RR: 17 (25 Dec 2021 05:01) (14 - 17)  SpO2: 96% (25 Dec 2021 05:01) (96% - 98%)  I&O's Detail    24 Dec 2021 07:01  -  25 Dec 2021 07:00  --------------------------------------------------------  IN:    IV PiggyBack: 100 mL    Lactated Ringers: 1210 mL    Oral Fluid: 1300 mL  Total IN: 2610 mL    OUT:    Bulb (mL): 110 mL    Voided (mL): 1650 mL  Total OUT: 1760 mL    Total NET: 850 mL          Physical Exam:  General: NAD, resting comfortably in bed  Pulmonary: Nonlabored breathing, no respiratory distress  CV: NSR  Abd: soft, appropriately tender to palpation, no guarding, incisions c/d/i, FANI - serous  Extremities: (-) edema, warm, well-perfused    LABS:                        11.9   14.13 )-----------( 485      ( 25 Dec 2021 07:10 )             35.7     12-25    136  |  99  |  9   ----------------------------<  100<H>  3.5   |  26  |  0.79    Ca    8.6      25 Dec 2021 07:10  Phos  3.2     12-25  Mg     1.9     12-25            RADIOLOGY & ADDITIONAL STUDIES:    27 yo M with no significant PMH/PSH presenting with appendicitis now s/p laparoscopic converted to open appendectomy 12/19, lap abd washout 12/23. WBC at 14. Will continue to monitor white count    Regular diet  Pain/Nausea PRN  ZosynJP  HSQ/SCD  AM labs

## 2021-12-26 ENCOUNTER — TRANSCRIPTION ENCOUNTER (OUTPATIENT)
Age: 26
End: 2021-12-26

## 2021-12-26 VITALS
DIASTOLIC BLOOD PRESSURE: 70 MMHG | HEART RATE: 77 BPM | RESPIRATION RATE: 17 BRPM | OXYGEN SATURATION: 97 % | SYSTOLIC BLOOD PRESSURE: 124 MMHG | TEMPERATURE: 99 F

## 2021-12-26 LAB
ANION GAP SERPL CALC-SCNC: 11 MMOL/L — SIGNIFICANT CHANGE UP (ref 5–17)
BUN SERPL-MCNC: 6 MG/DL — LOW (ref 7–23)
CALCIUM SERPL-MCNC: 8.6 MG/DL — SIGNIFICANT CHANGE UP (ref 8.4–10.5)
CHLORIDE SERPL-SCNC: 102 MMOL/L — SIGNIFICANT CHANGE UP (ref 96–108)
CO2 SERPL-SCNC: 27 MMOL/L — SIGNIFICANT CHANGE UP (ref 22–31)
CREAT SERPL-MCNC: 0.87 MG/DL — SIGNIFICANT CHANGE UP (ref 0.5–1.3)
GLUCOSE SERPL-MCNC: 111 MG/DL — HIGH (ref 70–99)
HCT VFR BLD CALC: 37.8 % — LOW (ref 39–50)
HGB BLD-MCNC: 11.9 G/DL — LOW (ref 13–17)
MAGNESIUM SERPL-MCNC: 2.4 MG/DL — SIGNIFICANT CHANGE UP (ref 1.6–2.6)
MCHC RBC-ENTMCNC: 28.8 PG — SIGNIFICANT CHANGE UP (ref 27–34)
MCHC RBC-ENTMCNC: 31.5 GM/DL — LOW (ref 32–36)
MCV RBC AUTO: 91.5 FL — SIGNIFICANT CHANGE UP (ref 80–100)
NRBC # BLD: 0 /100 WBCS — SIGNIFICANT CHANGE UP (ref 0–0)
PHOSPHATE SERPL-MCNC: 3.7 MG/DL — SIGNIFICANT CHANGE UP (ref 2.5–4.5)
PLATELET # BLD AUTO: 533 K/UL — HIGH (ref 150–400)
POTASSIUM SERPL-MCNC: 3.5 MMOL/L — SIGNIFICANT CHANGE UP (ref 3.5–5.3)
POTASSIUM SERPL-SCNC: 3.5 MMOL/L — SIGNIFICANT CHANGE UP (ref 3.5–5.3)
RBC # BLD: 4.13 M/UL — LOW (ref 4.2–5.8)
RBC # FLD: 12.5 % — SIGNIFICANT CHANGE UP (ref 10.3–14.5)
SODIUM SERPL-SCNC: 140 MMOL/L — SIGNIFICANT CHANGE UP (ref 135–145)
WBC # BLD: 11.98 K/UL — HIGH (ref 3.8–10.5)
WBC # FLD AUTO: 11.98 K/UL — HIGH (ref 3.8–10.5)

## 2021-12-26 RX ORDER — POTASSIUM CHLORIDE 20 MEQ
20 PACKET (EA) ORAL ONCE
Refills: 0 | Status: COMPLETED | OUTPATIENT
Start: 2021-12-26 | End: 2021-12-26

## 2021-12-26 RX ORDER — METRONIDAZOLE 500 MG
1 TABLET ORAL
Qty: 14 | Refills: 0
Start: 2021-12-26 | End: 2022-01-01

## 2021-12-26 RX ORDER — CEFPODOXIME PROXETIL 100 MG
1 TABLET ORAL
Qty: 14 | Refills: 0
Start: 2021-12-26 | End: 2022-01-01

## 2021-12-26 RX ORDER — POTASSIUM CHLORIDE 20 MEQ
20 PACKET (EA) ORAL ONCE
Refills: 0 | Status: DISCONTINUED | OUTPATIENT
Start: 2021-12-26 | End: 2021-12-26

## 2021-12-26 RX ORDER — METRONIDAZOLE 500 MG
1 TABLET ORAL
Qty: 21 | Refills: 0
Start: 2021-12-26 | End: 2022-01-01

## 2021-12-26 RX ORDER — METRONIDAZOLE 500 MG
500 TABLET ORAL EVERY 12 HOURS
Refills: 0 | Status: DISCONTINUED | OUTPATIENT
Start: 2021-12-26 | End: 2021-12-26

## 2021-12-26 RX ORDER — CEFPODOXIME PROXETIL 100 MG
200 TABLET ORAL EVERY 12 HOURS
Refills: 0 | Status: DISCONTINUED | OUTPATIENT
Start: 2021-12-26 | End: 2021-12-26

## 2021-12-26 RX ADMIN — PIPERACILLIN AND TAZOBACTAM 200 GRAM(S): 4; .5 INJECTION, POWDER, LYOPHILIZED, FOR SOLUTION INTRAVENOUS at 05:11

## 2021-12-26 RX ADMIN — Medication 20 MILLIEQUIVALENT(S): at 13:47

## 2021-12-26 RX ADMIN — HEPARIN SODIUM 5000 UNIT(S): 5000 INJECTION INTRAVENOUS; SUBCUTANEOUS at 05:11

## 2021-12-26 NOTE — PROGRESS NOTE ADULT - SUBJECTIVE AND OBJECTIVE BOX
GENERAL SURGERY PROGRESS NOTE    SUBJECTIVE: Patient seen and examined bedside. Had a few episodes of loose stool overnight. Passing gas. Tolerating diet. ASHUTOSH MCDONALD. Exam below.    cefpodoxime 200 milliGRAM(s) Oral every 12 hours  heparin   Injectable 5000 Unit(s) SubCutaneous every 8 hours  metroNIDAZOLE    Tablet 500 milliGRAM(s) Oral every 12 hours      Vital Signs Last 24 Hrs  T(C): 36.8 (26 Dec 2021 05:00), Max: 36.8 (26 Dec 2021 05:00)  T(F): 98.2 (26 Dec 2021 05:00), Max: 98.2 (26 Dec 2021 05:00)  HR: 65 (26 Dec 2021 05:00) (52 - 87)  BP: 111/61 (26 Dec 2021 05:00) (109/61 - 128/67)  BP(mean): 78 (26 Dec 2021 05:00) (78 - 78)  RR: 17 (26 Dec 2021 05:00) (17 - 18)  SpO2: 96% (26 Dec 2021 05:00) (96% - 97%)  I&O's Detail    25 Dec 2021 07:01  -  26 Dec 2021 07:00  --------------------------------------------------------  IN:    Oral Fluid: 200 mL  Total IN: 200 mL    OUT:    Bulb (mL): 25 mL    Voided (mL): 1775 mL  Total OUT: 1800 mL    Total NET: -1600 mL          PHYSICAL EXAM    General: NAD, resting comfortably in bed  C/V: NSR  Pulm: Nonlabored breathing, no respiratory distress on room air  Abd: soft, ND, appropriate incisional tenderness, no rebound tenderness, no guarding, FANI draining minimal serous output  Extrem: WWP, no edema, SCDs in place        LABS:                        11.9   11.98 )-----------( 533      ( 26 Dec 2021 07:54 )             37.8     12-26    140  |  102  |  6<L>  ----------------------------<  111<H>  3.5   |  27  |  0.87    Ca    8.6      26 Dec 2021 07:54  Phos  3.7     12-26  Mg     2.4     12-26            RADIOLOGY & ADDITIONAL STUDIES:

## 2021-12-26 NOTE — DISCHARGE NOTE NURSING/CASE MANAGEMENT/SOCIAL WORK - PATIENT PORTAL LINK FT
You can access the FollowMyHealth Patient Portal offered by [facility] by registering at the following website: [email or web address]. By joining [employer]’s FollowMyHealth portal, you will also be able to view your health information using other applications (apps) compatible with our system.

## 2021-12-26 NOTE — PROGRESS NOTE ADULT - PROVIDER SPECIALTY LIST ADULT
Surgery
Hospitalist
Internal Medicine
Internal Medicine
Surgery
Internal Medicine

## 2021-12-26 NOTE — DISCHARGE NOTE NURSING/CASE MANAGEMENT/SOCIAL WORK - NSDCPEFALRISK_GEN_ALL_CORE
For information on Fall & Injury Prevention, visit: [email or web address] OR  [email or web address] OR  https://www.cdc.gov/steadi/patient.html

## 2021-12-26 NOTE — PROGRESS NOTE ADULT - ASSESSMENT
27 yo M with no significant PMH/PSH presenting with appendicitis now s/p laparoscopic converted to open appendectomy 12/19, lap abd washout 12/23    regular diet  Pain/Nausea PRN  Switch to cefpodoxime/flagyl PO  FANI  HSQ/SCD  AM labs

## 2021-12-27 LAB
CULTURE RESULTS: NO GROWTH — SIGNIFICANT CHANGE UP
CULTURE RESULTS: SIGNIFICANT CHANGE UP
CULTURE RESULTS: SIGNIFICANT CHANGE UP
SPECIMEN SOURCE: SIGNIFICANT CHANGE UP

## 2021-12-31 LAB — SURGICAL PATHOLOGY STUDY: SIGNIFICANT CHANGE UP

## 2022-01-03 DIAGNOSIS — E83.39 OTHER DISORDERS OF PHOSPHORUS METABOLISM: ICD-10-CM

## 2022-01-03 DIAGNOSIS — R42 DIZZINESS AND GIDDINESS: ICD-10-CM

## 2022-01-03 DIAGNOSIS — K66.0 PERITONEAL ADHESIONS (POSTPROCEDURAL) (POSTINFECTION): ICD-10-CM

## 2022-01-03 DIAGNOSIS — R10.9 UNSPECIFIED ABDOMINAL PAIN: ICD-10-CM

## 2022-01-03 DIAGNOSIS — K35.32 ACUTE APPENDICITIS WITH PERFORATION, LOCALIZED PERITONITIS, AND GANGRENE, WITHOUT ABSCESS: ICD-10-CM

## 2022-01-03 DIAGNOSIS — E83.42 HYPOMAGNESEMIA: ICD-10-CM

## 2022-01-03 DIAGNOSIS — D62 ACUTE POSTHEMORRHAGIC ANEMIA: ICD-10-CM

## 2022-01-03 DIAGNOSIS — E87.6 HYPOKALEMIA: ICD-10-CM

## 2022-01-03 DIAGNOSIS — R19.7 DIARRHEA, UNSPECIFIED: ICD-10-CM

## 2022-01-03 DIAGNOSIS — A41.9 SEPSIS, UNSPECIFIED ORGANISM: ICD-10-CM

## 2022-01-03 DIAGNOSIS — E86.0 DEHYDRATION: ICD-10-CM

## 2022-01-03 DIAGNOSIS — R65.21 SEVERE SEPSIS WITH SEPTIC SHOCK: ICD-10-CM

## 2022-01-03 DIAGNOSIS — K56.7 ILEUS, UNSPECIFIED: ICD-10-CM

## 2022-01-03 DIAGNOSIS — Z53.31 LAPAROSCOPIC SURGICAL PROCEDURE CONVERTED TO OPEN PROCEDURE: ICD-10-CM

## 2022-01-03 DIAGNOSIS — N17.9 ACUTE KIDNEY FAILURE, UNSPECIFIED: ICD-10-CM

## 2022-01-03 DIAGNOSIS — Z91.013 ALLERGY TO SEAFOOD: ICD-10-CM

## 2022-01-10 PROBLEM — Z78.9 OTHER SPECIFIED HEALTH STATUS: Chronic | Status: ACTIVE | Noted: 2021-12-19

## 2022-01-10 PROBLEM — Z00.00 ENCOUNTER FOR PREVENTIVE HEALTH EXAMINATION: Status: ACTIVE | Noted: 2022-01-10

## 2022-01-19 ENCOUNTER — LABORATORY RESULT (OUTPATIENT)
Age: 27
End: 2022-01-19

## 2022-01-19 ENCOUNTER — APPOINTMENT (OUTPATIENT)
Dept: GASTROENTEROLOGY | Facility: CLINIC | Age: 27
End: 2022-01-19
Payer: COMMERCIAL

## 2022-01-19 VITALS
HEIGHT: 72 IN | TEMPERATURE: 97.4 F | DIASTOLIC BLOOD PRESSURE: 70 MMHG | OXYGEN SATURATION: 97 % | WEIGHT: 141 LBS | RESPIRATION RATE: 15 BRPM | SYSTOLIC BLOOD PRESSURE: 110 MMHG | HEART RATE: 79 BPM | BODY MASS INDEX: 19.1 KG/M2

## 2022-01-19 DIAGNOSIS — L92.9 GRANULOMATOUS DISORDER OF THE SKIN AND SUBCUTANEOUS TISSUE, UNSPECIFIED: ICD-10-CM

## 2022-01-19 DIAGNOSIS — Z83.79 FAMILY HISTORY OF OTHER DISEASES OF THE DIGESTIVE SYSTEM: ICD-10-CM

## 2022-01-19 DIAGNOSIS — Z78.9 OTHER SPECIFIED HEALTH STATUS: ICD-10-CM

## 2022-01-19 PROCEDURE — 99244 OFF/OP CNSLTJ NEW/EST MOD 40: CPT

## 2022-01-19 RX ORDER — POLYETHYLENE GLYCOL 3350 AND ELECTROLYTES WITH LEMON FLAVOR 236; 22.74; 6.74; 5.86; 2.97 G/4L; G/4L; G/4L; G/4L; G/4L
236 POWDER, FOR SOLUTION ORAL
Qty: 1 | Refills: 0 | Status: ACTIVE | COMMUNITY
Start: 2022-01-19 | End: 1900-01-01

## 2022-01-19 NOTE — PHYSICAL EXAM
[General Appearance - Alert] : alert [General Appearance - In No Acute Distress] : in no acute distress [General Appearance - Well-Appearing] : healthy appearing [Sclera] : the sclera and conjunctiva were normal [Outer Ear] : the ears and nose were normal in appearance [Neck Appearance] : the appearance of the neck was normal [Exaggerated Use Of Accessory Muscles For Inspiration] : no accessory muscle use [Auscultation Breath Sounds / Voice Sounds] : lungs were clear to auscultation bilaterally [Apical Impulse] : the apical impulse was normal [Heart Sounds] : normal S1 and S2 [Heart Sounds Gallop] : no gallops [Bowel Sounds] : normal bowel sounds [Abdomen Soft] : soft [Abdomen Tenderness] : non-tender [] : no hepato-splenomegaly [No CVA Tenderness] : no ~M costovertebral angle tenderness [Oriented To Time, Place, And Person] : oriented to person, place, and time

## 2022-01-20 LAB
CRP SERPL-MCNC: <3 MG/L
INR PPP: 0.97 RATIO
PT BLD: 11.6 SEC
VIT B12 SERPL-MCNC: 562 PG/ML

## 2022-01-21 NOTE — ASSESSMENT
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 Patient with no previous symptoms suggestive of inflammatory bowel disease . Patient with family history of ulcerative colitis in father.  At this point it is unclear whether his presentation was a manifestation of IBD such as Crohn's or whether the histologic findings were incidental to a presentation of perforated appendicitis. \par \par REC:\par EGD / colon in one month ( total of 8 weeks after surgery) \par Routine blood work with inflammatory markers\par Follow-up post procedure

## 2022-01-21 NOTE — CONSULT LETTER
[Dear  ___] : Dear  [unfilled], [Consult Letter:] : I had the pleasure of evaluating your patient, [unfilled]. [Please see my note below.] : Please see my note below. [Consult Closing:] : Thank you very much for allowing me to participate in the care of this patient.  If you have any questions, please do not hesitate to contact me. [Sincerely,] : Sincerely, [FreeTextEntry3] : Preet De La Cruz MD\par Associate Professor of Medicine\par Chief of GI\par Director IBD Program\par [facility]\par

## 2022-01-21 NOTE — HISTORY OF PRESENT ILLNESS
[Heartburn] : denies heartburn [Nausea] : denies nausea [Vomiting] : denies vomiting [Diarrhea] : denies diarrhea [Constipation] : denies constipation [Yellow Skin Or Eyes (Jaundice)] : denies jaundice [Abdominal Pain] : denies abdominal pain [Abdominal Swelling] : denies abdominal swelling [Rectal Pain] : denies rectal pain [de-identified] : 26 year male patient previously healthy is here for follow up after appendectomy. Patient had appendectomy Dec 19 after presenting with  symptoms of diarrhea , RLQ pain and fever of one week duration .Patient found to have perforated appendix.  Appendectomy with cecum and ascending colon transection was performed and sent for pathology. Pathology showed bowel mucosa replaced by histocytic reaction and lymphoid infiltrate , with numerous non necrotizing micgogranulomas in adjoining bowel muscularis. Patient denies any history of chronic diarrhea , hematochezia , or recurrent abdominal pain. No GI symptoms now , no arthralgia , no history of  buccal aphthous ulceration , or symptoms suggestive of uveitis.  \par Patient is non smoker , with family history of ulcerative colitis in father

## 2022-02-02 ENCOUNTER — TRANSCRIPTION ENCOUNTER (OUTPATIENT)
Age: 27
End: 2022-02-02

## 2022-03-01 ENCOUNTER — NON-APPOINTMENT (OUTPATIENT)
Age: 27
End: 2022-03-01

## 2022-03-05 ENCOUNTER — LABORATORY RESULT (OUTPATIENT)
Age: 27
End: 2022-03-05

## 2022-03-08 ENCOUNTER — OUTPATIENT (OUTPATIENT)
Dept: OUTPATIENT SERVICES | Facility: HOSPITAL | Age: 27
LOS: 1 days | Discharge: HOME | End: 2022-03-08
Payer: COMMERCIAL

## 2022-03-08 ENCOUNTER — RESULT REVIEW (OUTPATIENT)
Age: 27
End: 2022-03-08

## 2022-03-08 ENCOUNTER — APPOINTMENT (OUTPATIENT)
Dept: GASTROENTEROLOGY | Facility: HOSPITAL | Age: 27
End: 2022-03-08

## 2022-03-08 DIAGNOSIS — K08.409 PARTIAL LOSS OF TEETH, UNSPECIFIED CAUSE, UNSPECIFIED CLASS: Chronic | ICD-10-CM

## 2022-03-08 PROCEDURE — 43239 EGD BIOPSY SINGLE/MULTIPLE: CPT

## 2022-03-08 PROCEDURE — 88305 TISSUE EXAM BY PATHOLOGIST: CPT

## 2022-03-08 PROCEDURE — 45380 COLONOSCOPY AND BIOPSY: CPT

## 2022-03-08 PROCEDURE — 88305 TISSUE EXAM BY PATHOLOGIST: CPT | Mod: 26

## 2022-03-09 LAB — SURGICAL PATHOLOGY STUDY: SIGNIFICANT CHANGE UP

## 2022-03-11 ENCOUNTER — NON-APPOINTMENT (OUTPATIENT)
Age: 27
End: 2022-03-11

## 2022-03-23 ENCOUNTER — APPOINTMENT (OUTPATIENT)
Dept: GASTROENTEROLOGY | Facility: CLINIC | Age: 27
End: 2022-03-23

## 2022-04-04 ENCOUNTER — APPOINTMENT (OUTPATIENT)
Dept: GASTROENTEROLOGY | Facility: CLINIC | Age: 27
End: 2022-04-04
Payer: COMMERCIAL

## 2022-04-04 PROCEDURE — 99213 OFFICE O/P EST LOW 20 MIN: CPT | Mod: 95

## 2022-04-05 NOTE — REASON FOR VISIT
[Home] : at home, [unfilled] , at the time of the visit. [Medical Office: ([facility])___] : at the medical office located in  [Verbal consent obtained from patient] : the patient, [unfilled] [Follow-Up: _____] : a [unfilled] follow-up visit

## 2022-04-06 NOTE — CONSULT LETTER
[Dear  ___] : Dear  [unfilled], [Consult Letter:] : I had the pleasure of evaluating your patient, [unfilled]. [Please see my note below.] : Please see my note below. [Consult Closing:] : Thank you very much for allowing me to participate in the care of this patient.  If you have any questions, please do not hesitate to contact me. [Sincerely,] : Sincerely, [FreeTextEntry3] : Preet De La Cruz MD\par Associate Professor of Medicine\par Chief of GI\par Director IBD Program\par [facility]\par

## 2022-04-06 NOTE — ASSESSMENT
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 Patient with family history of ulcerative colitis in father. s/p colonoscopy.\par \par No Endoscopic  evidence of IBD but biopsies from the terminal ileum I have identified a single nonnecrotizing microgranuloma.  The rest of the colon biopsies were unremarkable.  The significance of this finding is unclear and we will review at the next IBD conference whether this is a manifestation of IBD.  \par Surgical Pathology negative for TB \par Patient himself is asymptomatic.\par \par Plan to do labs/Capsule vs MRE in 1 year to assess for any evidence of macroscopic inflammation\par \par RTC 1 year\par \par Harinder Venegas MD\par Advanced IBD Fellow

## 2022-04-08 ENCOUNTER — NON-APPOINTMENT (OUTPATIENT)
Age: 27
End: 2022-04-08

## 2022-05-12 ENCOUNTER — TRANSCRIPTION ENCOUNTER (OUTPATIENT)
Age: 27
End: 2022-05-12

## 2022-05-12 ENCOUNTER — NON-APPOINTMENT (OUTPATIENT)
Age: 27
End: 2022-05-12

## 2022-05-13 ENCOUNTER — NON-APPOINTMENT (OUTPATIENT)
Age: 27
End: 2022-05-13

## 2022-05-13 DIAGNOSIS — K52.9 NONINFECTIVE GASTROENTERITIS AND COLITIS, UNSPECIFIED: ICD-10-CM

## 2023-03-27 NOTE — DISCHARGE NOTE PROVIDER - NSDCHC_MEDRECSTATUS_GEN_ALL_CORE
Patient: Toney Lemos    Procedure: Procedure(s):  COLONOSCOPY       Diagnosis: Screen for colon cancer [Z12.11]  Diagnosis Additional Information: No value filed.    Anesthesia Type:   MAC     Note:    Oropharynx: oropharynx clear of all foreign objects and spontaneously breathing  Level of Consciousness: drowsy  Oxygen Supplementation: face mask    Independent Airway: airway patency satisfactory and stable  Dentition: dentition unchanged  Vital Signs Stable: post-procedure vital signs reviewed and stable  Report to RN Given: handoff report given  Patient transferred to: Phase II    Handoff Report: Identifed the Patient, Identified the Reponsible Provider, Reviewed the pertinent medical history, Discussed the surgical course, Reviewed Intra-OP anesthesia mangement and issues during anesthesia, Set expectations for post-procedure period and Allowed opportunity for questions and acknowledgement of understanding      Vitals:  Vitals Value Taken Time   BP     Temp     Pulse     Resp     SpO2         Electronically Signed By: MACRINA Bowden CRNA  March 27, 2023  10:23 AM  
Admission Reconciliation is Completed  Discharge Reconciliation is Completed

## (undated) DEVICE — FORCEP RADIAL JAW 4 W NDL 2.2MM 2.8MM 240CM ORANGE DISP